# Patient Record
Sex: FEMALE | Race: WHITE
[De-identification: names, ages, dates, MRNs, and addresses within clinical notes are randomized per-mention and may not be internally consistent; named-entity substitution may affect disease eponyms.]

---

## 2017-02-23 ENCOUNTER — HOSPITAL ENCOUNTER (INPATIENT)
Dept: HOSPITAL 10 - REC | Age: 62
LOS: 1 days | Discharge: HOME | DRG: 583 | End: 2017-02-24
Attending: SURGERY | Admitting: SURGERY
Payer: COMMERCIAL

## 2017-02-23 VITALS — SYSTOLIC BLOOD PRESSURE: 141 MMHG | HEART RATE: 82 BPM | DIASTOLIC BLOOD PRESSURE: 67 MMHG | RESPIRATION RATE: 12 BRPM

## 2017-02-23 VITALS — DIASTOLIC BLOOD PRESSURE: 67 MMHG | HEART RATE: 82 BPM | RESPIRATION RATE: 18 BRPM | SYSTOLIC BLOOD PRESSURE: 152 MMHG

## 2017-02-23 VITALS — DIASTOLIC BLOOD PRESSURE: 60 MMHG | RESPIRATION RATE: 13 BRPM | HEART RATE: 80 BPM | SYSTOLIC BLOOD PRESSURE: 141 MMHG

## 2017-02-23 VITALS — DIASTOLIC BLOOD PRESSURE: 64 MMHG | HEART RATE: 80 BPM | RESPIRATION RATE: 16 BRPM | SYSTOLIC BLOOD PRESSURE: 148 MMHG

## 2017-02-23 VITALS — RESPIRATION RATE: 16 BRPM | DIASTOLIC BLOOD PRESSURE: 66 MMHG | SYSTOLIC BLOOD PRESSURE: 144 MMHG | HEART RATE: 82 BPM

## 2017-02-23 VITALS — DIASTOLIC BLOOD PRESSURE: 71 MMHG | RESPIRATION RATE: 16 BRPM | SYSTOLIC BLOOD PRESSURE: 133 MMHG | HEART RATE: 88 BPM

## 2017-02-23 VITALS — HEART RATE: 84 BPM | DIASTOLIC BLOOD PRESSURE: 75 MMHG | SYSTOLIC BLOOD PRESSURE: 139 MMHG | RESPIRATION RATE: 18 BRPM

## 2017-02-23 VITALS — DIASTOLIC BLOOD PRESSURE: 70 MMHG | RESPIRATION RATE: 18 BRPM | SYSTOLIC BLOOD PRESSURE: 139 MMHG | HEART RATE: 86 BPM

## 2017-02-23 VITALS — RESPIRATION RATE: 18 BRPM | DIASTOLIC BLOOD PRESSURE: 63 MMHG | HEART RATE: 81 BPM | SYSTOLIC BLOOD PRESSURE: 130 MMHG

## 2017-02-23 VITALS — SYSTOLIC BLOOD PRESSURE: 138 MMHG | HEART RATE: 84 BPM | DIASTOLIC BLOOD PRESSURE: 67 MMHG | RESPIRATION RATE: 21 BRPM

## 2017-02-23 VITALS — SYSTOLIC BLOOD PRESSURE: 138 MMHG | DIASTOLIC BLOOD PRESSURE: 98 MMHG | HEART RATE: 78 BPM | RESPIRATION RATE: 18 BRPM

## 2017-02-23 VITALS — RESPIRATION RATE: 16 BRPM | DIASTOLIC BLOOD PRESSURE: 74 MMHG | SYSTOLIC BLOOD PRESSURE: 146 MMHG | HEART RATE: 86 BPM

## 2017-02-23 VITALS — DIASTOLIC BLOOD PRESSURE: 69 MMHG | RESPIRATION RATE: 21 BRPM | SYSTOLIC BLOOD PRESSURE: 149 MMHG | HEART RATE: 84 BPM

## 2017-02-23 VITALS — SYSTOLIC BLOOD PRESSURE: 146 MMHG | DIASTOLIC BLOOD PRESSURE: 66 MMHG | RESPIRATION RATE: 14 BRPM | HEART RATE: 82 BPM

## 2017-02-23 VITALS — HEART RATE: 86 BPM | RESPIRATION RATE: 18 BRPM | DIASTOLIC BLOOD PRESSURE: 66 MMHG | SYSTOLIC BLOOD PRESSURE: 146 MMHG

## 2017-02-23 VITALS — DIASTOLIC BLOOD PRESSURE: 69 MMHG | SYSTOLIC BLOOD PRESSURE: 128 MMHG | HEART RATE: 71 BPM | RESPIRATION RATE: 19 BRPM

## 2017-02-23 VITALS
HEIGHT: 61 IN | BODY MASS INDEX: 27.06 KG/M2 | HEIGHT: 61 IN | WEIGHT: 143.3 LBS | WEIGHT: 143.3 LBS | BODY MASS INDEX: 27.06 KG/M2

## 2017-02-23 VITALS — SYSTOLIC BLOOD PRESSURE: 146 MMHG | DIASTOLIC BLOOD PRESSURE: 65 MMHG | RESPIRATION RATE: 21 BRPM | HEART RATE: 88 BPM

## 2017-02-23 VITALS — HEART RATE: 85 BPM | RESPIRATION RATE: 18 BRPM | DIASTOLIC BLOOD PRESSURE: 65 MMHG | SYSTOLIC BLOOD PRESSURE: 149 MMHG

## 2017-02-23 VITALS — HEART RATE: 82 BPM | DIASTOLIC BLOOD PRESSURE: 60 MMHG | RESPIRATION RATE: 16 BRPM | SYSTOLIC BLOOD PRESSURE: 134 MMHG

## 2017-02-23 VITALS — RESPIRATION RATE: 18 BRPM | HEART RATE: 88 BPM | SYSTOLIC BLOOD PRESSURE: 142 MMHG | DIASTOLIC BLOOD PRESSURE: 72 MMHG

## 2017-02-23 VITALS — HEART RATE: 80 BPM | SYSTOLIC BLOOD PRESSURE: 132 MMHG | DIASTOLIC BLOOD PRESSURE: 60 MMHG | RESPIRATION RATE: 14 BRPM

## 2017-02-23 VITALS — HEART RATE: 82 BPM | RESPIRATION RATE: 14 BRPM | SYSTOLIC BLOOD PRESSURE: 131 MMHG | DIASTOLIC BLOOD PRESSURE: 62 MMHG

## 2017-02-23 VITALS — RESPIRATION RATE: 17 BRPM | HEART RATE: 82 BPM | SYSTOLIC BLOOD PRESSURE: 144 MMHG | DIASTOLIC BLOOD PRESSURE: 63 MMHG

## 2017-02-23 VITALS — SYSTOLIC BLOOD PRESSURE: 166 MMHG | DIASTOLIC BLOOD PRESSURE: 73 MMHG | RESPIRATION RATE: 18 BRPM | HEART RATE: 94 BPM

## 2017-02-23 VITALS — SYSTOLIC BLOOD PRESSURE: 142 MMHG | DIASTOLIC BLOOD PRESSURE: 64 MMHG | RESPIRATION RATE: 20 BRPM | HEART RATE: 84 BPM

## 2017-02-23 VITALS — RESPIRATION RATE: 16 BRPM | DIASTOLIC BLOOD PRESSURE: 72 MMHG | SYSTOLIC BLOOD PRESSURE: 150 MMHG

## 2017-02-23 VITALS — RESPIRATION RATE: 14 BRPM | SYSTOLIC BLOOD PRESSURE: 140 MMHG | DIASTOLIC BLOOD PRESSURE: 62 MMHG

## 2017-02-23 VITALS — DIASTOLIC BLOOD PRESSURE: 67 MMHG | HEART RATE: 82 BPM | SYSTOLIC BLOOD PRESSURE: 142 MMHG | RESPIRATION RATE: 18 BRPM

## 2017-02-23 VITALS — DIASTOLIC BLOOD PRESSURE: 68 MMHG | HEART RATE: 86 BPM | RESPIRATION RATE: 12 BRPM | SYSTOLIC BLOOD PRESSURE: 131 MMHG

## 2017-02-23 DIAGNOSIS — G89.18: ICD-10-CM

## 2017-02-23 DIAGNOSIS — C50.912: Primary | ICD-10-CM

## 2017-02-23 DIAGNOSIS — E11.9: ICD-10-CM

## 2017-02-23 DIAGNOSIS — I10: ICD-10-CM

## 2017-02-23 PROCEDURE — 88307 TISSUE EXAM BY PATHOLOGIST: CPT

## 2017-02-23 PROCEDURE — 0HTU0ZZ RESECTION OF LEFT BREAST, OPEN APPROACH: ICD-10-PCS | Performed by: SURGERY

## 2017-02-23 PROCEDURE — 80048 BASIC METABOLIC PNL TOTAL CA: CPT

## 2017-02-23 PROCEDURE — 76705 ECHO EXAM OF ABDOMEN: CPT

## 2017-02-23 PROCEDURE — 82962 GLUCOSE BLOOD TEST: CPT

## 2017-02-23 PROCEDURE — 85025 COMPLETE CBC W/AUTO DIFF WBC: CPT

## 2017-02-23 RX ADMIN — SODIUM CHLORIDE AND POTASSIUM CHLORIDE SCH MLS/HR: 4.5; 1.49 INJECTION, SOLUTION INTRAVENOUS at 18:57

## 2017-02-23 RX ADMIN — INSULIN GLARGINE SCH UNIT: 100 INJECTION, SOLUTION SUBCUTANEOUS at 21:48

## 2017-02-23 NOTE — OPR
DATE OF OPERATION:  02/23/2017

 

 

PREOPERATIVE DIAGNOSIS:  Locally advanced left breast cancer.

 

POSTOPERATIVE DIAGNOSIS:  Locally advanced left breast cancer.

 

OPERATION PERFORMED:  Left modified radical mastectomy.

 

ANESTHESIA:  General.

 

ANESTHESIOLOGIST:  Akshat Liao DO

 

SURGEON:  Moody Lopez MD

 

ASSISTANT:  Herbert Domínguez MD

 

INDICATIONS FOR PROCEDURE:  The patient is a 62-year-old female who presented with a large mass in h
er left breast.  Workup including biopsy revealed a large cancer.  The patient was offered neoadjuva
nt chemotherapy, but did not wish to proceed in that fashion.  Therefore, she was scheduled for left
 modified radical mastectomy and she consented.

 

DESCRIPTION OF PROCEDURE:  The patient was brought to the operating theater, placed under general en
dotracheal tube anesthesia.  The left breast and axillary region was prepped and draped in usual zia
rile fashion.  Planned elliptical incision around the large palpable mass including the nipple areol
ar complex was demarcated with marking pen and carried out with 15 blade scalpel.  Subcutaneous tiss
ue was dissected with cautery.  [place] clamps were used to elevate the skin edges and skin flap
s were created sequentially using cautery, first to the clavicle superiorly then to the sternal bord
er medially to the inframammary fold, inferiorly and laterally until latissimus dorsi muscle was miroslava
ntified throughout its course.  Mastectomy then took place from medial to lateral using cautery.  At
 the border of the pectoralis major muscle, the pectoralis muscle was identified and the clavipector
al fascia was incised with blunt dissection along the chest wall.  The long thoracic nerve was ident
ified and kept out of harm's way.  More superiorly, the axillary vein was identified and dissected f
rom medial to lateral.  Subsequently, the thoracodorsal neurovascular bundle was identified in its u
sual anatomic location and kept out of harm's way.  Node bearing tissue between the long thoracic ne
rve and thoracodorsal nerve was meticulously harvested using the LigaSure device.  Final connective 
tissue attachments at the latissimus dorsi muscle were then transected with cautery.  Specimen was o
riented and sent for permanent pathologic analysis.  The wound was irrigated.  Minimal bleeding was 
controlled with cautery.  Two #10 flat Prashant-Jaffe drains were then brought through the left mid a
xillary line.  One was cut to size and laid within the axilla, the other was laid over the pectorali
s major muscle.  Both drains were secured in place with 2-0 nylon suture in the standard fashion.  T
he final irrigation and inspection took place.  Some redundant skin at the lateral aspect of the inc
ision was excised and the skin was then closed with a deep dermal layer of 4-0 Vicryl sutures in int
errupted fashion, followed by final skin approximation with 4-0 Vicryl sutures in subcuticular fashi
on.  Benzoin and Steri-Strips were then applied.  The patient tolerated the procedure well.  The est
imated blood loss was 100 mL.  There were no complications and the patient was transported in stable
 condition to the recovery room where circumferential compression wrap was applied.

 

 

Dictated By: MOODY LOPEZ MD

 

TL/NTS

DD:    02/23/2017 13:54:53

DT:    02/23/2017 14:14:57

Conf#: [id number]

DID#:  [id number]

CC: HERBERT DOMÍNGUEZ MD;*EndCC*

## 2017-02-23 NOTE — HP
DATE OF ADMISSION: 02/23/2017

 

CHIEF COMPLAINT AND HISTORY OF PRESENT ILLNESS:  The patient is a 62-year-old female with history of
 hypertension, diabetes and locally advanced left breast cancer.  The patient was seen by Dr. Lopez 
as an outpatient and was brought into hospital for left modified radical mastectomy.  The patient po
stoperatively has significant chest wall pain.  The patient is being admitted for further evaluation
 and management.  The patient denied any nausea, vomiting.  No recent history of fever or chills.  N
o history of dysuria or hematuria, no history of headache, dizziness or syncope.  No history of pare
sthesias or weakness in any extremities.  No history of acute skin rash or any joint swelling.

 

REVIEW OF SYSTEMS:  Were unremarkable.

 

PAST SURGICAL HISTORY:  None.

 

ALLERGIES:  NONE.

 

FAMILY HISTORY:  Patient's mother had stomach cancer.

 

MEDICATIONS:  List reviewed.

 

PHYSICAL EXAMINATION:

GENERAL:  The patient is awake, alert.

VITAL SIGNS:  Temperature 97.9, pulse 88, blood pressure 150/72, O2 saturation 100% on room air, res
pirations 16.

HEENT:  Conjunctivae and lids are normal.  Oropharynx clear.

NECK:  Supple.  No mass, no thyromegaly.

LUNGS:  Clear to auscultation.

CARDIOVASCULAR:  S1, S2 normal, no murmur.

ABDOMEN:  Soft, nondistended, nontender.  Bowel sounds positive.

EXTREMITIES:  No leg edema.

NEUROLOGIC:  The patient is awake, alert, fairly oriented with no gross focal deficit.

 

LABORATORY DATA:  Sodium 136, potassium 4.5, BUN 13, creatinine 0.5, glucose 234.

 

IMPRESSION:

1.  Locally advanced left breast cancer status post modified radical mastectomy.

2.  Hypertension.

3.  Diabetes mellitus.

 

PLAN:  The patient admitted on medical floor.  Patient will be started on Lantus as well as sliding 
scale insulin.  The patient will be continued on metformin.  We will also continue ACE inhibitor.  S
CD for DVT prophylaxis.  The patient will be started on Tylenol, Norco and IV morphine for pain cont
rol, depending upon severity.  Plan of care discussed with the patient's nurse.

 

 

Dictated By: BRYAN RAMIREZ/NTS

DD:    02/23/2017 17:54:58

DT:    02/23/2017 18:40:21

Conf#: [id number]

DID#:  [id number]

## 2017-02-24 VITALS — SYSTOLIC BLOOD PRESSURE: 115 MMHG | DIASTOLIC BLOOD PRESSURE: 59 MMHG | RESPIRATION RATE: 18 BRPM

## 2017-02-24 VITALS — RESPIRATION RATE: 18 BRPM | HEART RATE: 77 BPM | SYSTOLIC BLOOD PRESSURE: 116 MMHG | DIASTOLIC BLOOD PRESSURE: 66 MMHG

## 2017-02-24 VITALS — RESPIRATION RATE: 20 BRPM | SYSTOLIC BLOOD PRESSURE: 150 MMHG | HEART RATE: 87 BPM | DIASTOLIC BLOOD PRESSURE: 65 MMHG

## 2017-02-24 LAB
ADD SCAN DIFF: NO
ANION GAP SERPL CALC-SCNC: 13 MMOL/L (ref 8–16)
BASOPHILS # BLD AUTO: 0.1 10^3/UL (ref 0–0.1)
BASOPHILS NFR BLD: 0.5 % (ref 0–2)
BUN SERPL-MCNC: 9 MG/DL (ref 7–20)
CALCIUM SERPL-MCNC: 8.8 MG/DL (ref 8.4–10.2)
CHLORIDE SERPL-SCNC: 104 MMOL/L (ref 97–110)
CO2 SERPL-SCNC: 27 MMOL/L (ref 21–31)
CREAT SERPL-MCNC: 0.49 MG/DL (ref 0.44–1)
EOSINOPHIL # BLD: 0.3 10^3/UL (ref 0–0.5)
EOSINOPHIL NFR BLD: 2.4 % (ref 0–7)
ERYTHROCYTE [DISTWIDTH] IN BLOOD BY AUTOMATED COUNT: 12.2 % (ref 11.5–14.5)
GLUCOSE SERPL-MCNC: 162 MG/DL (ref 70–220)
HCT VFR BLD CALC: 35.4 % (ref 37–47)
HGB BLD-MCNC: 12.1 G/DL (ref 12–16)
LYMPHOCYTES # BLD AUTO: 3.3 10^3/UL (ref 0.8–2.9)
LYMPHOCYTES NFR BLD AUTO: 30 % (ref 15–51)
MCH RBC QN AUTO: 29.3 PG (ref 29–33)
MCHC RBC AUTO-ENTMCNC: 34.2 G/DL (ref 32–37)
MCV RBC AUTO: 85.7 FL (ref 82–101)
MONOCYTES # BLD: 0.6 10^3/UL (ref 0.3–0.9)
MONOCYTES NFR BLD: 5.7 % (ref 0–11)
NEUTROPHILS # BLD: 6.7 10^3/UL (ref 1.6–7.5)
NEUTROPHILS NFR BLD AUTO: 61.1 % (ref 39–77)
NRBC # BLD MANUAL: 0 10^3/UL (ref 0–0)
NRBC BLD QL: 0 /100WBC (ref 0–0)
PLATELET # BLD: 209 10^3/UL (ref 140–415)
PMV BLD AUTO: 11.2 FL (ref 7.4–10.4)
POTASSIUM SERPL-SCNC: 4.2 MMOL/L (ref 3.5–5.1)
RBC # BLD AUTO: 4.13 10^6/UL (ref 4.2–5.4)
SODIUM SERPL-SCNC: 140 MMOL/L (ref 135–144)
WBC # BLD AUTO: 10.9 10^3/UL (ref 4.8–10.8)

## 2017-02-24 RX ADMIN — SODIUM CHLORIDE AND POTASSIUM CHLORIDE SCH MLS/HR: 4.5; 1.49 INJECTION, SOLUTION INTRAVENOUS at 20:40

## 2017-02-24 RX ADMIN — SODIUM CHLORIDE AND POTASSIUM CHLORIDE SCH MLS/HR: 4.5; 1.49 INJECTION, SOLUTION INTRAVENOUS at 08:50

## 2017-02-24 RX ADMIN — INSULIN GLARGINE SCH UNIT: 100 INJECTION, SOLUTION SUBCUTANEOUS at 21:17

## 2017-02-24 NOTE — RADRPT
PROCEDURE:   US Abdomen (right upper quadrant). 

 

CLINICAL INDICATION:   Right upper quadrant abdomen pain.  

 

TECHNIQUE:   Multiple real-time longitudinal and transverse images of the right upper quadrant of th
e abdomen were acquired utilizing a curved array transducer. Images were reviewed on a high-resoluti
on PACS workstation. 

 

COMPARISON:   None 

 

FINDINGS:

The liver is normal in size and echogenicity.

There is no focal hepatic lesion.  Color Doppler and pulsed Doppler sonography demonstrate normal an
tegrade flow in the portal vein.  

The gallbladder is normal with no stones or wall thickening.  There is no pericholecystic fluid deepa
ection.

The bile ducts are normal with the common bile duct measuring 4.6 mm in diameter.  

The visualized portions of the pancreas are unremarkable with obscuration of the tail of the pancrea
s.  

No free fluid is present.  

The right kidney measures 10.1 cm.  There is normal  echogenicity of the right kidney.  There is no 
perinephric fluid collection.  No hydronephrosis, mass, or calculus is seen.   

 

IMPRESSION:

1.  Unremarkable right upper quadrant abdomen ultrasound.  

 

RPTAT: QQ

_____________________________________________ 

.Chele Rivero MD, MD           Date    Time 

Electronically viewed and signed by .Chele Rivero MD, MD on 02/24/2017 18:03 

 

D:  02/24/2017 18:03  T:  02/24/2017 18:03

.R/

## 2017-02-24 NOTE — PN
Date/Time of Note


Date/Time of Note


DATE: 2/24/17 


TIME: 17:51





Assessment/Plan


VTE Prophylaxis


VTE Prophylaxis Intervention:  SCD's





Lines/Catheters


IV Catheter Type (from [facility]):  Peripheral IV


Urinary Cath still in place:  No





Assessment/Plan


Assessment/Plan


1.  Locally advanced left breast cancer status post modified radical mastectomy.


2.  Hypertension.  Continue benazepril


3.  Diabetes mellitus.  Continue metformin, Lantus and NovoLog.


4.  Abdominal pain, pending abdominal ultrasound.


Further recommendations based on clinical course.  Plan of care discussed Dr. Patel.





Subjective


24 Hr Interval Summary


Free Text/Dictation


Patient's complains of abdominal pain, pending abdominal ultrasound.  Patient 

denies any nausea vomiting.





Exam/Review of Systems


Vital Signs


Vitals





 Vital Signs








  Date Time  Temp Pulse Resp B/P Pulse Ox O2 Delivery O2 Flow Rate FiO2


 


2/24/17 07:00 98.7 80 18 115/59 97   


 


2/24/17 05:43      Room Air  


 


2/24/17 00:05       2.0 














 Intake and Output   


 


 2/23/17 2/23/17 2/24/17





 15:00 23:00 07:00


 


Intake Total 1200 ml  1120 ml


 


Output Total 90 ml 170 ml 90 ml


 


Balance 1110 ml -170 ml 1030 ml











Exam


GENERAL:  The patient is awake, alert.


HEENT:  Conjunctivae and lids are normal.  


NECK:  Supple.  No mass, no thyromegaly.


LUNGS:  Clear to auscultation.


CARDIOVASCULAR:  S1, S2 normal, no murmur.


ABDOMEN:  Soft, nondistended, nontender.  Bowel sounds positive.


EXTREMITIES:  No leg edema.


NEUROLOGIC:  The patient is awake, alert.





Results


Result Diagram:  


2/24/17 0425                                                                   

             2/24/17 0445





Results 24 hrs





Laboratory Tests








Test


  2/23/17


21:44 2/24/17


01:58 2/24/17


04:25 2/24/17


04:45


 


Bedside Glucose 303  H 187    


 


Basophils #   0.1   


 


Basophils %   0.5   


 


Eosinophils #   0.3   


 


Eosinophils %   2.4   


 


Hematocrit   35.4  L 


 


Hemoglobin   12.1   


 


Lymphocytes #   3.3  H 


 


Lymphocytes %   30.0   


 


Mean Corpuscular Hemoglobin   29.3   


 


Mean Corpuscular Hemoglobin


Concent 


  


  34.2  


  


 


 


Mean Corpuscular Volume   85.7   


 


Mean Platelet Volume   11.2  H 


 


Monocytes #   0.6   


 


Monocytes %   5.7   


 


Neutrophils #   6.7   


 


Neutrophils %   61.1   


 


Nucleated Red Blood Cells #   0.0   


 


Nucleated Red Blood Cells %   0.0   


 


Platelet Count   209   


 


Red Blood Count   4.13  L 


 


Red Cell Distribution Width   12.2   


 


White Blood Count   10.9  H 


 


Anion Gap    13  


 


Blood Urea Nitrogen    9  


 


Calcium Level    8.8  


 


Carbon Dioxide Level    27  


 


Chloride Level    104  


 


Creatinine    0.49  


 


Glucose Level    162  


 


Potassium Level    4.2  


 


Sodium Level    140  














Test


  2/24/17


08:49 2/24/17


11:46 


  


 


 


Bedside Glucose 171   267  H  











Medications


Medications





 Current Medications


Ondansetron HCl (Zofran Inj) 4 mg Q6H  PRN IV NAUSEA AND/OR VOMITING Last 

administered on 2/23/17at 15:09; Admin Dose 4 MG;  Start 2/23/17 at 14:00


Morphine Sulfate 2 mg 2 mg Q1H  PRN IV PAIN;  Start 2/23/17 at 14:00


Acetaminophen 100 ml @  400 mls/hr Q6H  PRN IVPB PAIN;  Start 2/23/17 at 14:00


Potassium Chloride/Sodium Chloride (1/2 NS + KCl 20 Meq) 1,000 ml @  75 mls/hr 

L86H31H IV  Last administered on 2/24/17at 08:50; Admin Dose 75 MLS/HR;  Start 2 /23/17 at 18:00


Aspirin (Halfprin) 81 mg DAILY PO  Last administered on 2/24/17at 08:56; Admin 

Dose 81 MG;  Start 2/24/17 at 09:00


Benazepril HCl (Lotensin) 20 mg DAILY PO  Last administered on 2/24/17at 08:57; 

Admin Dose 20 MG;  Start 2/24/17 at 09:00


Miscellaneous Information 1 ea NOTE XX ;  Start 2/23/17 at 18:00


Glucose (Glutose) 15 gm Q15M  PRN PO DECREASED GLUCOSE;  Start 2/23/17 at 18:00


Glucose (Glutose) 22.5 gm Q15M  PRN PO DECREASED GLUCOSE;  Start 2/23/17 at 18:

00


Dextrose (D50w Syringe) 25 ml Q15M  PRN IV DECREASED GLUCOSE;  Start 2/23/17 at 

18:00


Dextrose (D50w Syringe) 50 ml Q15M  PRN IV DECREASED GLUCOSE;  Start 2/23/17 at 

18:00


Glucagon (Glucagen) 1 mg Q15M  PRN IM DECREASED GLUCOSE;  Start 2/23/17 at 18:00


Glucose (Glutose) 15 gm Q15M  PRN BUCCAL DECREASED GLUCOSE;  Start 2/23/17 at 18

:00


Insulin Glargine (Lantus) 30 unit DAILY@20 SC  Last administered on 2/23/17at 21

:48; Admin Dose 30 UNIT;  Start 2/23/17 at 20:00


Diagnostic Test (Pha) (Accucheck) 1 ea 02 XX  Last administered on 2/24/17at 02:

10; Admin Dose 1 EA;  Start 2/24/17 at 02:00


Acetaminophen/ Hydrocodone Bitart (Norco (5/325)) 1 tab Q4H  PRN PO PAIN LEVEL 4

-6;  Start 2/23/17 at 18:00











REGINA RAZO Feb 24, 2017 17:55

## 2017-02-24 NOTE — PN
DATE:  02/24/2017

 

 

Today is postop day #1, postop left modified radical mastectomy with axillary dissection  

 

SUBJECTIVE:  The patient does not have that much of complaint from the chest, but she is having some
 pain in right upper quadrant.  No nausea, no vomiting.  Tolerated diet.

 

OBJECTIVE:

VITAL SIGNS:  Temperature 98.7, heart rate 80, respirations 18, blood pressure 115/59, saturation 97
% on room air.  Prashant-Ajffe drain #1 has drained 180 mL,  #2 has drained 120 mL since operation.  
It is colorless, serosanguineous.

HEART:  Regular.

LUNGS:  Clear.  

CHEST:  The dressing is not that tight.

ABDOMEN:  Soft.  Right upper quadrant, lashell is some tenderness on deep pressure (probably positive M
urphy sign).  

EXTREMITIES:  No pitting edema, no calf tenderness.

 

ASSESSMENT:  A 62-year-old with modified radical mastectomy and axillary dissection for locally adva
nced cancer of the left breast, stable today complaining of right upper quadrant abdominal pain.

 

PLAN:  We are going to get a stat ultrasound of the right upper quadrant to make sure the patient do
es not have cholecystitis.  If that resolves, patient can be discharged home today and follow up wit
h Dr. Lopez in his office.  The patient will be instructed by the nurses how to empty the Prashant-Pr
att and how to measure the drainage and record it on a piece of paper every night or whenever it is 
needed to be done.  When she goes back to Dr. Lopez' office, to take the detailed recording of the d
rainage.

 

 

Dictated By: WESLY KILLIAN/LUIS ALBERTO

DD:    02/24/2017 12:56:42

DT:    02/24/2017 14:03:52

Conf#: [id number]

DID#:  [id number]

## 2017-02-26 NOTE — DS
DATE OF ADMISSION: 02/23/2017

DATE OF DISCHARGE: 02/24/2017

 

FINAL DIAGNOSES:

1.  Locally advanced breast cancer status post modified radical mastectomy.  

2.  Hypertension.

3.  Diabetes mellitus.

 

BRIEF HISTORY:  The patient is a 62-year-old female with history of hypertension, diabetes, locally 
advanced left breast cancer.  The patient was seen by Dr. Lopez as an outpatient and brought to the 
hospital for left modified radical mastectomy.  Postoperative, the patient experienced some signific
ant pain and was admitted for further evaluation and management.

 

HOSPITAL COURSE:  The patient was given Tylenol, morphine, and Norco p.r.n. for pain and Zofran for 
nausea.  The patient was given Lantus and insulin according to sliding scale, NovoLog insulin.  The 
patient's blood pressure was well controlled.  The patient complained of some abdominal pain and gal
lbladder ultrasound was ordered by surgeon, which was unremarkable ultrasound of right upper quadran
t abdominal.  The patient's condition improved.  The patient was able to tolerate diet.  Denied any 
nausea or vomiting.  Pain was well controlled and patient was discharged home.

 

CONDITION ON DISCHARGE:  Hemodynamically stable.

 

ACTIVITY:  As patient tolerates.  No lifting more than 25 pounds for 6 weeks.

 

DISCHARGE DIET:  1800 ADA, 2 g sodium, low fat, low cholesterol diet.

 

DISCHARGE MEDICATIONS:  

1.  The patient was given prescriptions for [place] p.r.n. for pain.  

2.  The patient is to continue on her home medication of metformin.  

3.  NPH insulin.

4.  Quinapril.  

5.  Aspirin.

 

FOLLOWUP:  The patient is instructed to follow up with Dr. Lopez in postoperative appointment next w
[place].

 

Interdisciplinary plan of care was established for this patient.  Plan of care was discussed with Dr Sadia Arthur.

 

 

Dictated By: REGINA RAZO NP for BRYAN ARTHUR MD

 

SR/NTS

DD:    02/26/2017 15:19:41

DT:    02/26/2017 21:17:04

Conf#: [id number]

DID#:  [id number]

CC: OLIVIA LOPEZ MD;*EndCC*

## 2017-03-10 ENCOUNTER — HOSPITAL ENCOUNTER (EMERGENCY)
Dept: HOSPITAL 10 - FTE | Age: 62
Discharge: HOME | End: 2017-03-10
Payer: COMMERCIAL

## 2017-03-10 VITALS
HEART RATE: 81 BPM | TEMPERATURE: 99 F | DIASTOLIC BLOOD PRESSURE: 57 MMHG | RESPIRATION RATE: 18 BRPM | SYSTOLIC BLOOD PRESSURE: 121 MMHG

## 2017-03-10 VITALS
BODY MASS INDEX: 27.26 KG/M2 | BODY MASS INDEX: 27.26 KG/M2 | HEIGHT: 61 IN | WEIGHT: 144.4 LBS | HEIGHT: 61 IN | WEIGHT: 144.4 LBS

## 2017-03-10 DIAGNOSIS — T81.4XXA: Primary | ICD-10-CM

## 2017-03-10 DIAGNOSIS — Z79.84: ICD-10-CM

## 2017-03-10 DIAGNOSIS — Z79.82: ICD-10-CM
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
## 2017-03-10 DIAGNOSIS — Z79.4: ICD-10-CM

## 2017-03-10 DIAGNOSIS — Z85.3: ICD-10-CM

## 2017-03-10 PROCEDURE — 96372 THER/PROPH/DIAG INJ SC/IM: CPT

## 2017-03-10 NOTE — ERD
ER Documentation


Chief Complaint


Date/Time


DATE: 3/10/17 


TIME: 21:03


Chief Complaint


sp left mastectomy  2 weeks ago, c/o pain on left operative site x2 days





HPI


8-year-old female presents with redness and some pain to the left mastectomy 

wound performed 2 weeks ago by Dr. Lopez.  She denies fevers, vomiting, 

shortness breath or chest pain.  She notes the redness and increased pain over 

the last 2 days.





ROS


All systems reviewed and are negative except as per history of present illness.





Medications


Home Meds


Active Scripts


Cephalexin* (Keflex*) 500 Mg Capsule, 500 MG PO QID for 7 Days, CAP


   Prov:YUE CLARKE MD         3/10/17


Sulfamethoxazole-Trimethoprim* (Bactrim* DS) 800-160 Mg Tab, 1 TAB PO BID for 7 

Days, TAB


   Prov:YUE CLARKE MD         3/10/17


Hydrocodone/Acetaminophen (Norco 5-325 Tablet) 1 Each Tablet, 1 TAB PO Q6H Y 

for PAIN, #14 TAB


   Prov:YUE CLARKE MD         3/10/17


Acetaminophen* (Tylophen*) 500 Mg Capsule, 1 CAP PO Q6H Y for PAIN AND OR 

ELEVATED TEMP, #20 CAP


   Prov:KIZZY TILLMAN         8/8/15


Ibuprofen* (Motrin*) 400 Mg Tab, 400 MG PO Q6 Y for PAIN AND/OR INFLAMMATION, #

20 TAB


   Prov:KIZZY TILLMAN         8/8/15


Reported Medications


Quinapril Hcl (Accupril) 10 Mg Tablet, 20 MG PO DAILY


   3/17/14


Aspirin (Aspirin) 81 Mg Tablet.dr, 81 MG PO DAILY


   3/17/14


Nph, Human Insulin Isophane* (Novolin N*) 100 U/Ml Vial, 20 SQ BID, VIAL


   3/17/14


Nph, Human Insulin Isophane (Humulin N) 100 Units/Ml Vial, 15 SQ BID, VIAL


   3/17/14


Metformin* (Glucophage*) 1,000 Mg Tablet, 1000 MG PO BID


   3/17/14





Allergies


Allergies:  


Coded Allergies:  


     No Known Allergies (Unverified  Allergy, 3/17/14)





PMhx/Soc


History of Surgery:  Yes (L mastectomy feb 2017)


Anesthesia Reaction:  No


Hx Neurological Disorder:  No


Hx Respiratory Disorders:  No


Hx Cardiac Disorders:  No


Hx Psychiatric Problems:  No


Hx Miscellaneous Medical Probl:  Yes (breast CA)


Hx Alcohol Use:  No


Hx Substance Use:  No


Hx Tobacco Use:  No


Smoking Status:  Never smoker





Physical Exam


Vitals





Vital Signs








  Date Time  Temp Pulse Resp B/P Pulse Ox O2 Delivery O2 Flow Rate FiO2


 


3/10/17 19:01 99.8 101 20 138/65 98   








Physical Exam


Const:  [] Alert, non-ill-appearing.


Head:   Atraumatic 


Eyes:    Normal Conjunctiva


ENT:    Normal External Ears, Nose and Mouth.


Neck:               Full range of motion..~ No meningismus.


Resp:    Clear to auscultation bilaterally


Cardio:    Regular rate and rhythm, no murmurs


Abd:    Soft, non tender, non distended. Normal bowel sounds


Skin:    No petechiae or rashes.  His healing left mastectomy wound with some  

surrounding redness at the wound edges and extending superiorly on the medial 

portion of the wound..  There is no dehiscence or active discharge or bleeding 

or fluctuance.


Back:    No midline or flank tenderness


Ext:    No cyanosis, or edema


Neur:    Awake and alert


Psych:    Normal Mood and Affect


Results 24 hrs





 Current Medications








 Medications


  (Trade)  Dose


 Ordered  Sig/Venu


 Route


 PRN Reason  Start Time


 Stop Time Status Last Admin


Dose Admin


 


 Ceftriaxone Sodium


  (Rocephin)  1 gm  ONCE  ONCE


 IM


   3/10/17 20:30


 3/10/17 20:31 DC 3/10/17 20:46


 


 


 Lidocaine


  (Xylocaine 1%


  (Mdv) 20 ml)  20 ml  ONCE  ONCE


 SC


   3/10/17 20:30


 3/10/17 20:31 DC 3/10/17 20:46


 


 


 Ibuprofen


  (Motrin)  600 mg  ONCE  ONCE


 PO


   3/10/17 20:30


 3/10/17 20:31 DC 3/10/17 20:46


 


 


 Trimethoprim/


 Sulfamethoxazole


  (Bactrim (Ds))  1 tab  ONCE  ONCE


 PO


   3/10/17 20:30


 3/10/17 20:31 DC 3/10/17 20:47


 


 


 Acetaminophen/


 Hydrocodone Bitart


  (Norco (5/325))  1 tab  ONCE  ONCE


 PO


   3/10/17 20:30


 3/10/17 20:31 DC 3/10/17 20:47


 











Procedures/MDM


Patient presents with signs and symptoms of worsening redness and pain over 

last 2 days of her left mastectomy performed 2 weeks ago.  She appears to have 

a postoperative wound infection which appears superficial.  She was given 

Rocephin 1 g IM and Bactrim double strength by mouth as well as Norco 5 mg by 

mouth for pain.  She will discharged home with prescription of Bactrim and 

Keflex and Norco and instructions to follow-up with Dr. Lopez as scheduled in 

the next 2-3 days.  Patient should return sooner for fevers, vomiting, 

shortness breath, chest pain, new or worsening symptoms.  There is no evidence 

of abscess or signs or symptoms to suggest sepsis, acute abdomen, pneumonia, 

additional causes of pain and redness of the chest wall.





Departure


Diagnosis:  


 Primary Impression:  


 Post op infection


Condition:  Stable


Patient Instructions:  Post Op Wound Check, Infection





Additional Instructions:  


CHEQUE CON FAIRCHILD MAYELIN MG MAS PRONTO PARA FIEBRE , NUEVA SIMPTOMAS.











YUE CLARKE MD Mar 10, 2017 21:05

## 2017-03-15 RX ADMIN — FOLIC ACID SCH MLS/HR: 5 INJECTION, SOLUTION INTRAMUSCULAR; INTRAVENOUS; SUBCUTANEOUS at 16:30

## 2017-03-16 ENCOUNTER — HOSPITAL ENCOUNTER (INPATIENT)
Dept: HOSPITAL 10 - SDS | Age: 62
LOS: 6 days | Discharge: HOME HEALTH | DRG: 863 | End: 2017-03-22
Attending: SURGERY | Admitting: SURGERY
Payer: COMMERCIAL

## 2017-03-16 VITALS — DIASTOLIC BLOOD PRESSURE: 67 MMHG | RESPIRATION RATE: 15 BRPM | SYSTOLIC BLOOD PRESSURE: 158 MMHG | HEART RATE: 76 BPM

## 2017-03-16 VITALS — SYSTOLIC BLOOD PRESSURE: 126 MMHG | DIASTOLIC BLOOD PRESSURE: 63 MMHG | HEART RATE: 72 BPM | RESPIRATION RATE: 22 BRPM

## 2017-03-16 VITALS — SYSTOLIC BLOOD PRESSURE: 152 MMHG | RESPIRATION RATE: 18 BRPM | DIASTOLIC BLOOD PRESSURE: 73 MMHG | HEART RATE: 71 BPM

## 2017-03-16 VITALS — HEART RATE: 76 BPM | RESPIRATION RATE: 14 BRPM | SYSTOLIC BLOOD PRESSURE: 160 MMHG | DIASTOLIC BLOOD PRESSURE: 62 MMHG

## 2017-03-16 VITALS — HEART RATE: 74 BPM | DIASTOLIC BLOOD PRESSURE: 70 MMHG | RESPIRATION RATE: 14 BRPM | SYSTOLIC BLOOD PRESSURE: 152 MMHG

## 2017-03-16 VITALS — DIASTOLIC BLOOD PRESSURE: 70 MMHG | SYSTOLIC BLOOD PRESSURE: 153 MMHG | HEART RATE: 74 BPM | RESPIRATION RATE: 15 BRPM

## 2017-03-16 VITALS — DIASTOLIC BLOOD PRESSURE: 61 MMHG | RESPIRATION RATE: 16 BRPM | HEART RATE: 74 BPM | SYSTOLIC BLOOD PRESSURE: 157 MMHG

## 2017-03-16 VITALS — HEART RATE: 70 BPM | DIASTOLIC BLOOD PRESSURE: 73 MMHG | RESPIRATION RATE: 15 BRPM | SYSTOLIC BLOOD PRESSURE: 153 MMHG

## 2017-03-16 VITALS
WEIGHT: 138.89 LBS | HEIGHT: 60 IN | WEIGHT: 138.89 LBS | BODY MASS INDEX: 27.27 KG/M2 | BODY MASS INDEX: 27.27 KG/M2 | HEIGHT: 60 IN

## 2017-03-16 VITALS — RESPIRATION RATE: 20 BRPM | DIASTOLIC BLOOD PRESSURE: 57 MMHG | HEART RATE: 72 BPM | SYSTOLIC BLOOD PRESSURE: 116 MMHG

## 2017-03-16 VITALS — SYSTOLIC BLOOD PRESSURE: 112 MMHG | HEART RATE: 77 BPM | RESPIRATION RATE: 18 BRPM | DIASTOLIC BLOOD PRESSURE: 56 MMHG

## 2017-03-16 VITALS — SYSTOLIC BLOOD PRESSURE: 138 MMHG | DIASTOLIC BLOOD PRESSURE: 62 MMHG | HEART RATE: 72 BPM | RESPIRATION RATE: 22 BRPM

## 2017-03-16 VITALS — DIASTOLIC BLOOD PRESSURE: 70 MMHG | RESPIRATION RATE: 14 BRPM | SYSTOLIC BLOOD PRESSURE: 152 MMHG | HEART RATE: 72 BPM

## 2017-03-16 VITALS — HEART RATE: 70 BPM | RESPIRATION RATE: 14 BRPM | SYSTOLIC BLOOD PRESSURE: 142 MMHG | DIASTOLIC BLOOD PRESSURE: 65 MMHG

## 2017-03-16 VITALS — HEART RATE: 80 BPM | SYSTOLIC BLOOD PRESSURE: 162 MMHG | DIASTOLIC BLOOD PRESSURE: 73 MMHG | RESPIRATION RATE: 18 BRPM

## 2017-03-16 VITALS — RESPIRATION RATE: 20 BRPM | DIASTOLIC BLOOD PRESSURE: 66 MMHG | SYSTOLIC BLOOD PRESSURE: 124 MMHG

## 2017-03-16 DIAGNOSIS — B95.61: ICD-10-CM

## 2017-03-16 DIAGNOSIS — Z79.4: ICD-10-CM

## 2017-03-16 DIAGNOSIS — E11.9: ICD-10-CM

## 2017-03-16 DIAGNOSIS — I10: ICD-10-CM

## 2017-03-16 DIAGNOSIS — L03.313: ICD-10-CM

## 2017-03-16 DIAGNOSIS — T81.4XXA: Primary | ICD-10-CM

## 2017-03-16 DIAGNOSIS — Z85.3: ICD-10-CM

## 2017-03-16 LAB
ADD SCAN DIFF: NO
ANION GAP SERPL CALC-SCNC: 18 MMOL/L (ref 8–16)
APTT BLD: 28.9 SEC (ref 25–35)
BASOPHILS # BLD AUTO: 0.1 10^3/UL (ref 0–0.1)
BASOPHILS NFR BLD: 0.6 % (ref 0–2)
BUN SERPL-MCNC: 15 MG/DL (ref 7–20)
CALCIUM SERPL-MCNC: 10 MG/DL (ref 8.4–10.2)
CHLORIDE SERPL-SCNC: 97 MMOL/L (ref 97–110)
CO2 SERPL-SCNC: 27 MMOL/L (ref 21–31)
CREAT SERPL-MCNC: 0.61 MG/DL (ref 0.44–1)
EOSINOPHIL # BLD: 0.1 10^3/UL (ref 0–0.5)
EOSINOPHIL NFR BLD: 0.9 % (ref 0–7)
ERYTHROCYTE [DISTWIDTH] IN BLOOD BY AUTOMATED COUNT: 12 % (ref 11.5–14.5)
GLUCOSE SERPL-MCNC: 173 MG/DL (ref 70–220)
HCT VFR BLD CALC: 37.4 % (ref 37–47)
HGB BLD-MCNC: 12.9 G/DL (ref 12–16)
INR PPP: 1.07
LYMPHOCYTES # BLD AUTO: 2 10^3/UL (ref 0.8–2.9)
LYMPHOCYTES NFR BLD AUTO: 21.8 % (ref 15–51)
MCH RBC QN AUTO: 29.3 PG (ref 29–33)
MCHC RBC AUTO-ENTMCNC: 34.5 G/DL (ref 32–37)
MCV RBC AUTO: 85 FL (ref 82–101)
MONOCYTES # BLD: 0.6 10^3/UL (ref 0.3–0.9)
MONOCYTES NFR BLD: 6.4 % (ref 0–11)
NEUTROPHILS # BLD: 6.3 10^3/UL (ref 1.6–7.5)
NEUTROPHILS NFR BLD AUTO: 69.9 % (ref 39–77)
NRBC # BLD MANUAL: 0 10^3/UL (ref 0–0)
NRBC BLD QL: 0 /100WBC (ref 0–0)
PLATELET # BLD: 431 10^3/UL (ref 140–415)
PMV BLD AUTO: 9.1 FL (ref 7.4–10.4)
POTASSIUM SERPL-SCNC: 5.1 MMOL/L (ref 3.5–5.1)
PROTHROMBIN TIME: 13.9 SEC (ref 12.2–14.2)
PT RATIO: 1.1
RBC # BLD AUTO: 4.4 10^6/UL (ref 4.2–5.4)
SODIUM SERPL-SCNC: 137 MMOL/L (ref 135–144)
WBC # BLD AUTO: 8.9 10^3/UL (ref 4.8–10.8)

## 2017-03-16 PROCEDURE — 93005 ELECTROCARDIOGRAM TRACING: CPT

## 2017-03-16 PROCEDURE — 71010: CPT

## 2017-03-16 PROCEDURE — 82962 GLUCOSE BLOOD TEST: CPT

## 2017-03-16 PROCEDURE — 87075 CULTR BACTERIA EXCEPT BLOOD: CPT

## 2017-03-16 PROCEDURE — 85025 COMPLETE CBC W/AUTO DIFF WBC: CPT

## 2017-03-16 PROCEDURE — 85730 THROMBOPLASTIN TIME PARTIAL: CPT

## 2017-03-16 PROCEDURE — 90686 IIV4 VACC NO PRSV 0.5 ML IM: CPT

## 2017-03-16 PROCEDURE — 80048 BASIC METABOLIC PNL TOTAL CA: CPT

## 2017-03-16 PROCEDURE — 85610 PROTHROMBIN TIME: CPT

## 2017-03-16 PROCEDURE — 87070 CULTURE OTHR SPECIMN AEROBIC: CPT

## 2017-03-16 RX ADMIN — FOLIC ACID SCH MLS/HR: 5 INJECTION, SOLUTION INTRAMUSCULAR; INTRAVENOUS; SUBCUTANEOUS at 05:50

## 2017-03-16 RX ADMIN — CALCIUM GLUCONATE SCH MLS/HR: 94 INJECTION, SOLUTION INTRAVENOUS at 22:19

## 2017-03-16 NOTE — RADRPT
PROCEDURE:   XR Chest. 

 

CLINICAL INDICATION:   Preoperative study 

 

TECHNIQUE:   Single AP view of the chest were obtained 

 

COMPARISON:   None 

 

FINDINGS:

The heart and mediastinum are within normal limits. The pulmonary vasculature are unremarkable.  The
 aorta is grossly unremarkable.  There is no lung consolidation, pleural effusion or pneumothorax.  
Degenerative changes are seen within the thoracic spine.  There is no acute osseous abnormality.

 

IMPRESSION:

No acute disease. 

 

RPTAT: AA

_____________________________________________ 

.Tamie Hernandez MD, MD           Date    Time 

Electronically viewed and signed by .Tamie Hernandez MD, MD on 03/16/2017 15:30 

 

D:  03/16/2017 15:30  T:  03/16/2017 15:30

.LUCHO/

## 2017-03-16 NOTE — OPR
DATE OF OPERATION:  03/16/2017

 

 

PREOPERATIVE DIAGNOSIS:  Left chest wall/wound abscess.

 

POSTOPERATIVE DIAGNOSIS:  Left chest wall/wound abscess.

 

OPERATION PERFORMED:  I and D of left chest wall/wound abscess.

 

ANESTHESIA:  General.

 

ANESTHESIOLOGIST:  Deedee Loja MD

 

SURGEON:  Moody Lopez MD

 

ASSISTANT:  None.

 

INDICATIONS FOR PROCEDURE:  The patient is an unfortunate 62-year-old female who was recently treate
d for relatively advanced left breast cancer.  She had comorbidity of diabetes, and she presented ap
proximately 10 days postop with evidence of a wound infection associated with a chest wall abscess. 
 She was counseled as to need for surgical drainage.  She consented and was scheduled for surgery.

 

DESCRIPTION OF PROCEDURE:  The patient was brought to the operating theater, placed under general an
esthesia.  The left chest wall was prepped and draped in usual sterile fashion.  The medial aspect o
f previous surgical incision was incised.  Copious amounts of pus were obtained.  The cavity was suc
tion evacuated.  Counterincision was made laterally, and irrigation of the entire wound cavity took 
place with a combination of hydrogen peroxide and Betadine.  Please note that prior to irrigation, w
ound cultures were taken.  At this point, a Penrose drain was brought through the wound and sutured 
to itself.  The patient tolerated procedure well.  The estimated blood loss was 10 mL.  There were n
o complications.  The patient was transported in stable condition to recovery room, where circumfere
ntial compression dressing was applied.

 

 

Dictated By: MOODY LOPEZ MD

 

TL/NTS

DD:    03/16/2017 18:59:01

DT:    03/16/2017 19:26:28

Conf#: [id number]

DID#:  [id number]

## 2017-03-17 VITALS — SYSTOLIC BLOOD PRESSURE: 138 MMHG | RESPIRATION RATE: 18 BRPM | DIASTOLIC BLOOD PRESSURE: 79 MMHG

## 2017-03-17 VITALS — RESPIRATION RATE: 18 BRPM | DIASTOLIC BLOOD PRESSURE: 63 MMHG | SYSTOLIC BLOOD PRESSURE: 134 MMHG

## 2017-03-17 VITALS — HEART RATE: 74 BPM | DIASTOLIC BLOOD PRESSURE: 69 MMHG | RESPIRATION RATE: 18 BRPM | SYSTOLIC BLOOD PRESSURE: 148 MMHG

## 2017-03-17 VITALS — DIASTOLIC BLOOD PRESSURE: 61 MMHG | SYSTOLIC BLOOD PRESSURE: 132 MMHG | RESPIRATION RATE: 18 BRPM

## 2017-03-17 VITALS — SYSTOLIC BLOOD PRESSURE: 167 MMHG | DIASTOLIC BLOOD PRESSURE: 85 MMHG | RESPIRATION RATE: 19 BRPM

## 2017-03-17 PROCEDURE — 0W9800Z DRAINAGE OF CHEST WALL WITH DRAINAGE DEVICE, OPEN APPROACH: ICD-10-PCS | Performed by: SURGERY

## 2017-03-17 RX ADMIN — DOCUSATE SODIUM SCH MG: 100 CAPSULE, LIQUID FILLED ORAL at 10:56

## 2017-03-17 RX ADMIN — Medication PRN MLS/HR: at 10:16

## 2017-03-17 RX ADMIN — CALCIUM GLUCONATE SCH MLS/HR: 94 INJECTION, SOLUTION INTRAVENOUS at 08:30

## 2017-03-17 RX ADMIN — CALCIUM GLUCONATE SCH MLS/HR: 94 INJECTION, SOLUTION INTRAVENOUS at 17:21

## 2017-03-17 RX ADMIN — PIPERACILLIN SODIUM AND TAZOBACTAM SODIUM SCH MLS/HR: 3; .375 INJECTION, POWDER, LYOPHILIZED, FOR SOLUTION INTRAVENOUS at 10:13

## 2017-03-17 RX ADMIN — PIPERACILLIN SODIUM AND TAZOBACTAM SODIUM SCH MLS/HR: 3; .375 INJECTION, POWDER, LYOPHILIZED, FOR SOLUTION INTRAVENOUS at 17:49

## 2017-03-17 RX ADMIN — Medication PRN MLS/HR: at 00:32

## 2017-03-17 RX ADMIN — PIPERACILLIN SODIUM AND TAZOBACTAM SODIUM SCH MLS/HR: 3; .375 INJECTION, POWDER, LYOPHILIZED, FOR SOLUTION INTRAVENOUS at 01:18

## 2017-03-17 RX ADMIN — LISINOPRIL SCH MG: 20 TABLET ORAL at 21:33

## 2017-03-17 RX ADMIN — DOCUSATE SODIUM SCH MG: 100 CAPSULE, LIQUID FILLED ORAL at 20:37

## 2017-03-17 NOTE — PN
DATE:  03/17/2017

 

 

Status post incision and drainage of the chest wall abscess following modified radical mastectomy ab
out 2 weeks ago.

 

SUBJECTIVE:  Feels better.

 

OBJECTIVE: 

VITAL SIGNS:   Temperature 97.9, 62, 18, 134/73, 97% room air saturation.  

The dressing was already removed because it was saturated  There is no active bleeding.  A new steri
le dressing was applied and taped to the chest wall.  Also, another culture was sent from the distal
 end of the wound.  The Penrose drain is in place.

 

ASSESSMENT:

Postop day #1,  status post incision and drainage  of chest wall wound and abscess post mastectomy.

 

PLAN:

1.  Continue antibiotics.  

2.  Keep the patient in house with IV antibiotics.  

3.  Change dressing p.r.n. and daily.

 

 

Dictated By: WESLY KILLIAN/NTS

DD:    03/17/2017 12:08:36

DT:    03/17/2017 12:27:18

Conf#: [id number]

DID#:  [id number]

## 2017-03-17 NOTE — HP
DATE OF ADMISSION: 03/16/2017

 

HISTORY OF PRESENT ILLNESS:  The patient is a 62-year-old  female, known to me from a previo
us admission.  The patient underwent a left modified radical mastectomy on 02/23/2017.  The patient 
also has a history of diabetes, hypertension and locally advanced left breast cancer.  The patient w
as seen in a postoperative appointment with Dr. Lopez and was noted to have a chest wall abscess and
 a postoperative wound infection.  The patient was brought to the hospital and underwent I and D of 
the left chest wall and wound abscess.  The patient required antibiotics and had significant pain an
d was admitted for further evaluation and management.

 

PAST MEDICAL HISTORY:  Positive for hypertension and diabetes.

 

PAST SURGICAL HISTORY:  Per HPI.

 

FAMILY HISTORY:  The patient's mother had gastric cancer.

 

SOCIAL HISTORY:  The patient lives at home.  The patient denies any tobacco use, denies any illicit 
drug use, denies any alcohol use.

 

ALLERGIES:  NO KNOWN ALLERGIES.

 

HOME MEDICATIONS: Include:

1.  Tylenol.

2.  Aspirin.

3.  Norco.

4.  Metformin.

5.  NPH 15 units subcutaneous b.i.d.

6.  Accupril.

 

REVIEW OF SYSTEMS:  A 12-point review of systems was negative unless mentioned in the HPI.

 

PHYSICAL ASSESSMENT:

GENERAL:  Well-developed, well-nourished female, in no acute distress.

VITAL SIGNS:  Temperature 97.9, pulse 62, blood pressure 134/63, respiratory rate 18, oxygen saturat
ion 97% on room air.

HEENT:  Head is atraumatic, normocephalic.  Pupils are equal, round, react to light and accommodatio
n.  Oral mucosa is pink and moist.

NECK:  Supple.  No cervical lymphadenopathy, no thyromegaly.

CHEST:  Lungs clear bilaterally.  There are no rhonchi, wheezes or rales noted.  The patient has a l
eft chest dry, clean and intact dressing.

CARDIOVASCULAR:  Normal S1, S2.  No murmurs, gallops, clicks, or rubs noted.

ABDOMEN:  Round, soft, nondistended, nontender.  Bowel sounds are present in all 4 quadrants.  There
 is no guarding or rebound tenderness.

EXTREMITIES:  There is no edema, clubbing or cyanosis.  Pulses are equal bilaterally at 2+.

SKIN:  There is no rash or petechiae noted.

NEUROLOGIC:  The patient is awake, alert and oriented x4.  No focal deficits noted.  Motor strength 
is 5/5 in all extremities.

 

LABORATORY DATA:  On admission CBC, white blood cells 8.9, hemoglobin 12.9, hematocrit 37.4, platele
ts 431.  Chemistry:  Sodium is 137, potassium 5.1, chloride 97, carbon dioxide 27, anion gap 18, BUN
 is 15, creatinine 0.62, glucose 173.

 

IMAGING:  Chest x-ray, with no acute disease.

 

ASSESSMENT AND PLAN:

1.  Chest wall abscess, status post incision and drainage of the left chest wall and wound abscess. 
 Will continue to follow up surgical recommendations. We will ask Dr. Dreyer to see the patient in i
nfectious disease consultation.  Continue antibiotics and dressing changes.

2.  Status post left modified radical mastectomy on 02/23/2017.

3.  Hypertension.  Will continue the patient's antihypertensive medications from home.  Continue hyd
ralazine p.r.n. for a systolic blood pressure above 170.

4.  Diabetes mellitus type 2.  Will continue the patient on NovoLog per moderate algorithm sliding s
[place].  

5.  Will continue morphine and Tylenol p.r.n. for pain and Zofran p.r.n. for nausea

 

Further recommendations based on clinical course.  Plan of care discussed with Dr. Arthur.

 

 

Dictated By: REGINA RAZO NP for BRYAN ARTHUR MD

 

SR/NTS

DD:    03/17/2017 13:01:14

DT:    03/17/2017 14:07:21

Conf#: [id number]

DID#:  [id number]

## 2017-03-18 VITALS — DIASTOLIC BLOOD PRESSURE: 70 MMHG | SYSTOLIC BLOOD PRESSURE: 155 MMHG | RESPIRATION RATE: 18 BRPM

## 2017-03-18 VITALS — DIASTOLIC BLOOD PRESSURE: 70 MMHG | RESPIRATION RATE: 18 BRPM | SYSTOLIC BLOOD PRESSURE: 147 MMHG | HEART RATE: 76 BPM

## 2017-03-18 VITALS — DIASTOLIC BLOOD PRESSURE: 70 MMHG | RESPIRATION RATE: 18 BRPM | SYSTOLIC BLOOD PRESSURE: 160 MMHG

## 2017-03-18 VITALS — RESPIRATION RATE: 18 BRPM | SYSTOLIC BLOOD PRESSURE: 133 MMHG | DIASTOLIC BLOOD PRESSURE: 77 MMHG

## 2017-03-18 LAB
ADD SCAN DIFF: NO
BASOPHILS # BLD AUTO: 0.1 10^3/UL (ref 0–0.1)
BASOPHILS NFR BLD: 0.7 % (ref 0–2)
EOSINOPHIL # BLD: 0.2 10^3/UL (ref 0–0.5)
EOSINOPHIL NFR BLD: 3.1 % (ref 0–7)
ERYTHROCYTE [DISTWIDTH] IN BLOOD BY AUTOMATED COUNT: 12.2 % (ref 11.5–14.5)
HCT VFR BLD CALC: 36.4 % (ref 37–47)
HGB BLD-MCNC: 12.2 G/DL (ref 12–16)
LYMPHOCYTES # BLD AUTO: 2.5 10^3/UL (ref 0.8–2.9)
LYMPHOCYTES NFR BLD AUTO: 35.1 % (ref 15–51)
MCH RBC QN AUTO: 29.1 PG (ref 29–33)
MCHC RBC AUTO-ENTMCNC: 33.5 G/DL (ref 32–37)
MCV RBC AUTO: 86.9 FL (ref 82–101)
MONOCYTES # BLD: 0.6 10^3/UL (ref 0.3–0.9)
MONOCYTES NFR BLD: 8.1 % (ref 0–11)
NEUTROPHILS # BLD: 3.7 10^3/UL (ref 1.6–7.5)
NEUTROPHILS NFR BLD AUTO: 52.6 % (ref 39–77)
NRBC # BLD MANUAL: 0 10^3/UL (ref 0–0)
NRBC BLD QL: 0 /100WBC (ref 0–0)
PLATELET # BLD: 391 10^3/UL (ref 140–415)
PMV BLD AUTO: 9.7 FL (ref 7.4–10.4)
RBC # BLD AUTO: 4.19 10^6/UL (ref 4.2–5.4)
WBC # BLD AUTO: 7 10^3/UL (ref 4.8–10.8)

## 2017-03-18 RX ADMIN — PIPERACILLIN SODIUM AND TAZOBACTAM SODIUM SCH MLS/HR: 3; .375 INJECTION, POWDER, LYOPHILIZED, FOR SOLUTION INTRAVENOUS at 12:07

## 2017-03-18 RX ADMIN — DOCUSATE SODIUM SCH MG: 100 CAPSULE, LIQUID FILLED ORAL at 21:00

## 2017-03-18 RX ADMIN — CALCIUM GLUCONATE SCH MLS/HR: 94 INJECTION, SOLUTION INTRAVENOUS at 04:30

## 2017-03-18 RX ADMIN — DOCUSATE SODIUM SCH MG: 100 CAPSULE, LIQUID FILLED ORAL at 08:25

## 2017-03-18 RX ADMIN — Medication PRN MLS/HR: at 21:20

## 2017-03-18 RX ADMIN — CALCIUM GLUCONATE SCH MLS/HR: 94 INJECTION, SOLUTION INTRAVENOUS at 14:09

## 2017-03-18 RX ADMIN — Medication PRN MLS/HR: at 06:50

## 2017-03-18 RX ADMIN — LISINOPRIL SCH MG: 20 TABLET ORAL at 08:26

## 2017-03-18 RX ADMIN — PIPERACILLIN SODIUM AND TAZOBACTAM SODIUM SCH MLS/HR: 3; .375 INJECTION, POWDER, LYOPHILIZED, FOR SOLUTION INTRAVENOUS at 01:32

## 2017-03-18 RX ADMIN — PIPERACILLIN SODIUM AND TAZOBACTAM SODIUM SCH MLS/HR: 3; .375 INJECTION, POWDER, LYOPHILIZED, FOR SOLUTION INTRAVENOUS at 18:46

## 2017-03-18 NOTE — CONS
Date/Time of Note


Date/Time of Note


DATE: 3/18/17 


TIME: 15:54





Assessment/Plan


Assessment/Plan


Chief Complaint/Hosp Course


ASSESSMENT 


Left chest wall abscess s/p I&D


Status post left modified radical mastectomy with axillary dissection for 

advanced cancer 2/23/17


DM


HTN


Abx: Zosyn





Plan: Will start Vanco, continue Zosyn, f/u final cx, f/u surgical rec-s





DW DR Mc


Problems:  





Consultation Date/Type/Reason


Admit Date/Time





Initial Consult Date


ID


Referring Provider:  REGINA RAZO





Exam/Review of Systems


Vital Signs


Vitals





 Vital Signs








  Date Time  Temp Pulse Resp B/P Pulse Ox O2 Delivery O2 Flow Rate FiO2


 


3/18/17 08:16 98.0 64 18 133/77 98   


 


3/18/17 06:32      Room Air  


 


3/16/17 19:25       6.0 














 Intake and Output   


 


 3/17/17 3/17/17 3/18/17





 15:00 23:00 07:00


 


Intake Total 800 ml 1580 ml 1850 ml


 


Output Total   1200 ml


 


Balance 800 ml 1580 ml 650 ml











Results


Result Diagram:  


3/18/17 0415                                                                   

             3/16/17 1500





Results 24 hrs





Laboratory Tests








Test


  3/17/17


17:24 3/17/17


20:13 3/18/17


01:31 3/18/17


04:15


 


Bedside Glucose 179   257  H 204   


 


Basophils #    0.1  


 


Basophils %    0.7  


 


Eosinophils #    0.2  


 


Eosinophils %    3.1  


 


Hematocrit    36.4  L


 


Hemoglobin    12.2  


 


Lymphocytes #    2.5  


 


Lymphocytes %    35.1  


 


Mean Corpuscular Hemoglobin    29.1  


 


Mean Corpuscular Hemoglobin


Concent 


  


  


  33.5  


 


 


Mean Corpuscular Volume    86.9  


 


Mean Platelet Volume    9.7  


 


Monocytes #    0.6  


 


Monocytes %    8.1  


 


Neutrophils #    3.7  


 


Neutrophils %    52.6  


 


Nucleated Red Blood Cells #    0.0  


 


Nucleated Red Blood Cells %    0.0  


 


Platelet Count    391  


 


Red Blood Count    4.19  L


 


Red Cell Distribution Width    12.2  


 


White Blood Count    7.0  #














Test


  3/18/17


07:40 3/18/17


11:55 


  


 


 


Bedside Glucose 245  H 337  H  











Medications


Medications





 Current Medications


Ondansetron HCl (Zofran Inj) 4 mg Q6H  PRN IV NAUSEA AND/OR VOMITING;  Start 3/

16/17 at 19:30


Morphine Sulfate 2 mg 2 mg Q1H  PRN IV PAIN;  Start 3/16/17 at 19:30


Acetaminophen 100 ml @  400 mls/hr Q6H  PRN IVPB PAIN Last administered on 3/18/

17at 06:50; Admin Dose 400 MLS/HR;  Start 3/16/17 at 19:30


Piperacillin Sod/ Tazobactam Sod 100 ml @  25 mls/hr TID@02,10,18 IVPB  Last 

administered on 3/18/17at 12:07; Admin Dose 25 MLS/HR;  Start 3/17/17 at 02:00


Sodium Chloride (1/2 NS) 1,000 ml @  100 mls/hr Q10H IV  Last administered on 3/

18/17at 14:09; Admin Dose 100 MLS/HR;  Start 3/16/17 at 22:30


Miscellaneous Information 1 ea NOTE XX ;  Start 3/16/17 at 22:30


Glucose (Glutose) 15 gm Q15M  PRN PO DECREASED GLUCOSE;  Start 3/16/17 at 22:30


Glucose (Glutose) 22.5 gm Q15M  PRN PO DECREASED GLUCOSE;  Start 3/16/17 at 22:

30


Dextrose (D50w Syringe) 25 ml Q15M  PRN IV DECREASED GLUCOSE;  Start 3/16/17 at 

22:30


Dextrose (D50w Syringe) 50 ml Q15M  PRN IV DECREASED GLUCOSE;  Start 3/16/17 at 

22:30


Glucagon (Glucagen) 1 mg Q15M  PRN IM DECREASED GLUCOSE;  Start 3/16/17 at 22:30


Glucose (Glutose) 15 gm Q15M  PRN BUCCAL DECREASED GLUCOSE;  Start 3/16/17 at 22

:30


Docusate Sodium (Colace) 100 mg BID PO  Last administered on 3/18/17at 08:25; 

Admin Dose 100 MG;  Start 3/17/17 at 11:00


Lisinopril (Zestril) 20 mg DAILY PO  Last administered on 3/18/17at 08:26; 

Admin Dose 20 MG;  Start 3/17/17 at 21:30


Clonidine (Catapres) 0.1 mg Q6H  PRN PO ELEVATED SYSTOLIC BP;  Start 3/17/17 at 

21:30


Senna (Senokot) 2 tab BID  PRN PO CONSTIPATION Last administered on 3/17/17at 21

:32; Admin Dose 2 TAB;  Start 3/17/17 at 21:30











TYLER CARBAJAL NP Mar 18, 2017 15:57

## 2017-03-18 NOTE — PN
DATE:  03/18/2017

 

 

SUBJECTIVE:  Feels better.

 

OBJECTIVE:

VITAL SIGNS:  Temperature 98, heart rate 64, respirations 18, blood pressure 133
/77, saturation 98% on room air.

 

LABORATORY DATA:  Blood sugar has been high up to 337.  CBC:  WBC 7000 with 52% 
neutrophils.  Microbiology shows staphylococcus species, no culture available 
yet.

 

ASSESSMENT:  The patient is a 62-year-old female status post modified radical 
mastectomy with axillary dissection for advanced cancer.  The patient had 
modified radical mastectomy 3 weeks ago.  The patient presented with cellulitis 
and abscess formation at the site of operation.  She was taken to the operating 
room and drained by Dr. Lopez, and culture was sent, antibiotic started.  The 
patient is doing fine so far.  IV antibiotic Zosyn.

 

PLAN:  Continue IV antibiotics.  Infectious disease consultation has been 
requested.  Change dressing daily and p.r.n.

 

 

Dictated By: WESLY DOMÍNGUEZ MD

 

PS/NTS

DD:    03/18/2017 14:24:20

DT:    03/18/2017 15:04:09

Conf#: [id number]

DID#:  [id number]

 

BENJAMIN

## 2017-03-18 NOTE — PN
Date/Time of Note


Date/Time of Note


DATE: 3/18/17 


TIME: 12:27





Assessment/Plan


VTE Prophylaxis


VTE Prophylaxis Intervention:  other





Lines/Catheters


IV Catheter Type (from [facility]):  Peripheral IV


Urinary Cath still in place:  No





Assessment/Plan


Chief Complaint/Hosp Course


1.  Chest wall abscess, status post incision and drainage of the left chest 

wall and wound abscess.  Will continue to follow up surgical recommendations. 

We will ask Dr. Dreyer to see the patient in infectious disease consultation.  

Continue antibiotics and dressing changes.


2.  Status post left modified radical mastectomy on 02/23/2017.


3.  Hypertension.  Will continue the patient's antihypertensive medications 

from home.  Continue hydralazine p.r.n. for a systolic blood pressure above 170.


4.  Diabetes mellitus type 2.  Will continue the patient on NovoLog per 

moderate algorithm sliding scale.  


5.  Will continue morphine and Tylenol p.r.n. for pain and Zofran p.r.n. for 

nausea


Problems:  





Subjective


24 Hr Interval Summary


Free Text/Dictation


Patient has no complaints





Exam/Review of Systems


Vital Signs


Vitals





 Vital Signs








  Date Time  Temp Pulse Resp B/P Pulse Ox O2 Delivery O2 Flow Rate FiO2


 


3/18/17 08:16 98.0 64 18 133/77 98   


 


3/18/17 06:32      Room Air  


 


3/16/17 19:25       6.0 














 Intake and Output   


 


 3/17/17 3/17/17 3/18/17





 15:00 23:00 07:00


 


Intake Total 800 ml 1580 ml 1850 ml


 


Output Total   1200 ml


 


Balance 800 ml 1580 ml 650 ml











Exam


Constitutional:  well developed


Head:  atraumatic, normocephalic


Neck:  supple


Respiratory:  diminished breath sounds


Cardiovascular:  regular rate and rhythm


Gastrointestinal:  non-tender, soft





Results


Result Diagram:  


3/18/17 0415                                                                   

             3/16/17 1500





Results 24 hrs





Laboratory Tests








Test


  3/17/17


14:08 3/17/17


17:24 3/17/17


20:13 3/18/17


01:31


 


Bedside Glucose 248  H 179   257  H 204  














Test


  3/18/17


04:15 3/18/17


07:40 3/18/17


11:55 


 


 


Basophils # 0.1     


 


Basophils % 0.7     


 


Eosinophils # 0.2     


 


Eosinophils % 3.1     


 


Hematocrit 36.4  L   


 


Hemoglobin 12.2     


 


Lymphocytes # 2.5     


 


Lymphocytes % 35.1     


 


Mean Corpuscular Hemoglobin 29.1     


 


Mean Corpuscular Hemoglobin


Concent 33.5  


  


  


  


 


 


Mean Corpuscular Volume 86.9     


 


Mean Platelet Volume 9.7     


 


Monocytes # 0.6     


 


Monocytes % 8.1     


 


Neutrophils # 3.7     


 


Neutrophils % 52.6     


 


Nucleated Red Blood Cells # 0.0     


 


Nucleated Red Blood Cells % 0.0     


 


Platelet Count 391     


 


Red Blood Count 4.19  L   


 


Red Cell Distribution Width 12.2     


 


White Blood Count 7.0  #   


 


Bedside Glucose  245  H 337  H 











Medications


Medications





 Current Medications


Ondansetron HCl (Zofran Inj) 4 mg Q6H  PRN IV NAUSEA AND/OR VOMITING;  Start 3/

16/17 at 19:30


Morphine Sulfate 2 mg 2 mg Q1H  PRN IV PAIN;  Start 3/16/17 at 19:30


Acetaminophen 100 ml @  400 mls/hr Q6H  PRN IVPB PAIN Last administered on 3/18/

17at 06:50; Admin Dose 400 MLS/HR;  Start 3/16/17 at 19:30


Piperacillin Sod/ Tazobactam Sod 100 ml @  25 mls/hr TID@02,10,18 IVPB  Last 

administered on 3/18/17at 12:07; Admin Dose 25 MLS/HR;  Start 3/17/17 at 02:00


Sodium Chloride (1/2 NS) 1,000 ml @  100 mls/hr Q10H IV  Last administered on 3/

17/17at 17:21; Admin Dose 100 MLS/HR;  Start 3/16/17 at 22:30


Miscellaneous Information 1 ea NOTE XX ;  Start 3/16/17 at 22:30


Glucose (Glutose) 15 gm Q15M  PRN PO DECREASED GLUCOSE;  Start 3/16/17 at 22:30


Glucose (Glutose) 22.5 gm Q15M  PRN PO DECREASED GLUCOSE;  Start 3/16/17 at 22:

30


Dextrose (D50w Syringe) 25 ml Q15M  PRN IV DECREASED GLUCOSE;  Start 3/16/17 at 

22:30


Dextrose (D50w Syringe) 50 ml Q15M  PRN IV DECREASED GLUCOSE;  Start 3/16/17 at 

22:30


Glucagon (Glucagen) 1 mg Q15M  PRN IM DECREASED GLUCOSE;  Start 3/16/17 at 22:30


Glucose (Glutose) 15 gm Q15M  PRN BUCCAL DECREASED GLUCOSE;  Start 3/16/17 at 22

:30


Docusate Sodium (Colace) 100 mg BID PO  Last administered on 3/18/17at 08:25; 

Admin Dose 100 MG;  Start 3/17/17 at 11:00


Lisinopril (Zestril) 20 mg DAILY PO  Last administered on 3/18/17at 08:26; 

Admin Dose 20 MG;  Start 3/17/17 at 21:30


Clonidine (Catapres) 0.1 mg Q6H  PRN PO ELEVATED SYSTOLIC BP;  Start 3/17/17 at 

21:30


Senna (Senokot) 2 tab BID  PRN PO CONSTIPATION Last administered on 3/17/17at 21

:32; Admin Dose 2 TAB;  Start 3/17/17 at 21:30











PRIYA HENDERSON Mar 18, 2017 12:27 13-Jan-2017 18:46

## 2017-03-19 VITALS — RESPIRATION RATE: 16 BRPM | DIASTOLIC BLOOD PRESSURE: 71 MMHG | SYSTOLIC BLOOD PRESSURE: 162 MMHG

## 2017-03-19 VITALS — SYSTOLIC BLOOD PRESSURE: 134 MMHG | HEART RATE: 58 BPM | DIASTOLIC BLOOD PRESSURE: 63 MMHG | RESPIRATION RATE: 16 BRPM

## 2017-03-19 VITALS — SYSTOLIC BLOOD PRESSURE: 140 MMHG | RESPIRATION RATE: 16 BRPM | DIASTOLIC BLOOD PRESSURE: 65 MMHG

## 2017-03-19 VITALS — SYSTOLIC BLOOD PRESSURE: 162 MMHG | DIASTOLIC BLOOD PRESSURE: 77 MMHG | RESPIRATION RATE: 16 BRPM

## 2017-03-19 VITALS — DIASTOLIC BLOOD PRESSURE: 66 MMHG | RESPIRATION RATE: 18 BRPM | SYSTOLIC BLOOD PRESSURE: 127 MMHG | HEART RATE: 18 BPM

## 2017-03-19 RX ADMIN — DOCUSATE SODIUM SCH MG: 100 CAPSULE, LIQUID FILLED ORAL at 08:13

## 2017-03-19 RX ADMIN — ACYCLOVIR SCH MG: 400 TABLET ORAL at 23:22

## 2017-03-19 RX ADMIN — CEFAZOLIN SCH MLS/HR: 1 INJECTION, POWDER, FOR SOLUTION INTRAMUSCULAR; INTRAVENOUS at 17:37

## 2017-03-19 RX ADMIN — LISINOPRIL SCH MG: 20 TABLET ORAL at 08:14

## 2017-03-19 RX ADMIN — CEFAZOLIN SCH MLS/HR: 1 INJECTION, POWDER, FOR SOLUTION INTRAMUSCULAR; INTRAVENOUS at 23:22

## 2017-03-19 RX ADMIN — CALCIUM GLUCONATE SCH MLS/HR: 94 INJECTION, SOLUTION INTRAVENOUS at 00:30

## 2017-03-19 RX ADMIN — CALCIUM GLUCONATE SCH MLS/HR: 94 INJECTION, SOLUTION INTRAVENOUS at 10:36

## 2017-03-19 RX ADMIN — DOCUSATE SODIUM SCH MG: 100 CAPSULE, LIQUID FILLED ORAL at 20:23

## 2017-03-19 RX ADMIN — Medication PRN MLS/HR: at 22:29

## 2017-03-19 RX ADMIN — PIPERACILLIN SODIUM AND TAZOBACTAM SODIUM SCH MLS/HR: 3; .375 INJECTION, POWDER, LYOPHILIZED, FOR SOLUTION INTRAVENOUS at 10:36

## 2017-03-19 RX ADMIN — ACYCLOVIR SCH MG: 400 TABLET ORAL at 17:45

## 2017-03-19 RX ADMIN — PIPERACILLIN SODIUM AND TAZOBACTAM SODIUM SCH MLS/HR: 3; .375 INJECTION, POWDER, LYOPHILIZED, FOR SOLUTION INTRAVENOUS at 01:39

## 2017-03-19 NOTE — PN
DATE:  03/19/2017

 

 

SUBJECTIVE:  Feels better, status post incision and drainage of the left breast abscess, is status p
ost left modified radical mastectomy and axillary resection for cancer.

 

OBJECTIVE:

VITAL SIGNS:  Temperature 98, heart rate 58, respirations 16, blood pressure 162/71, saturation 97% 
on room air.

 

LABORATORIES:  No labs today.  Blood sugar 335.  

 

DRESSING CHANGE:  Wound relatively clean.  Still has some brownish discharge.  Dressing was changed.

 

ASSESSMENT:  The patient with left breast incisional abscess postoperation for modified radical mast
ectomy and axillary dissection for cancer.  The culture has grown staph.  Today, the antibiotic was 
changed to clindamycin by infectious disease.

 

PLAN:  Continue IV antibiotics for as long as is needed and advised by infectious disease.

 

 

Dictated By: WESLY DOMÍNGUEZ MD

 

PS/LUIS ALBERTO

DD:    03/19/2017 16:07:58

DT:    03/19/2017 18:29:49

Conf#: [id number]

DID#:  [id number]

## 2017-03-19 NOTE — PN
Date/Time of Note


Date/Time of Note


DATE: 3/19/17 


TIME: 12:04





Assessment/Plan


VTE Prophylaxis


VTE Prophylaxis Intervention:  other





Lines/Catheters


IV C[facility]ter Type ([facility]):  Peripheral IV


Urinary Cath still in place:  No





Assessment/Plan


Chief Complaint/Hosp Course


1.  Chest wall abscess, status post incision and drainage of the left chest 

wall and wound abscess.  Will continue to follow up surgical recommendations. 

We will ask Dr. Dreyer to see the patient in infectious disease consultation.  

Continue antibiotics and dressing changes.


2.  Status post left modified radical mastectomy on 02/23/2017.


3.  Hypertension.  Will continue the patient's antihypertensive medications 

from home.  Continue hydralazine p.r.n. for a systolic blood pressure above 170.


4.  Diabetes mellitus type 2.  Will continue the patient on NovoLog per 

moderate algorithm sliding scale.  


5.  Will continue morphine and Tylenol p.r.n. for pain and Zofran p.r.n. for 

nausea


Problems:  





Subjective


24 Hr Interval Summary


Free Text/Dictation


Patient has no complaints





Exam/Review of Systems


Vital Signs


Vitals





 Vital Signs








  Date Time  Temp Pulse Resp B/P Pulse Ox O2 Delivery O2 Flow Rate FiO2


 


3/19/17 08:51 98.0 16 16 162/71 97   


 


3/19/17 08:12      Room Air  


 


3/16/17 19:25       6.0 














 Intake and Output   


 


 3/18/17 3/18/17 3/19/17





 15:00 23:00 07:00


 


Intake Total 100 ml 2150 ml 2250 ml


 


Output Total  1200 ml 2000 ml


 


Balance 100 ml 950 ml 250 ml











Exam


Constitutional:  well developed


Head:  atraumatic, normocephalic


Neck:  supple


Respiratory:  clear to auscultation


Cardiovascular:  regular rate and rhythm


Gastrointestinal:  non-tender, soft


Extremities:  normal pulses





Results


Result Diagram:  


3/18/17 0415                                                                   

             3/16/17 1500





Results 24 hrs





Laboratory Tests








Test


  3/18/17


17:00 3/18/17


20:08 3/19/17


01:42


 


Bedside Glucose 244  H 386  H 335  H











Medications


Medications





 Current Medications


Ondansetron HCl (Zofran Inj) 4 mg Q6H  PRN IV NAUSEA AND/OR VOMITING;  Start 3/

16/17 at 19:30


Morphine Sulfate 2 mg 2 mg Q1H  PRN IV PAIN;  Start 3/16/17 at 19:30


Acetaminophen 100 ml @  400 mls/hr Q6H  PRN IVPB PAIN Last administered on 3/18/

17at 21:20; Admin Dose 400 MLS/HR;  Start 3/16/17 at 19:30


Piperacillin Sod/ Tazobactam Sod 100 ml @  25 mls/hr TID@02,10,18 IVPB  Last 

administered on 3/19/17at 10:36; Admin Dose 25 MLS/HR;  Start 3/17/17 at 02:00


Sodium Chloride (1/2 NS) 1,000 ml @  100 mls/hr Q10H IV  Last administered on 3/

19/17at 10:36; Admin Dose 100 MLS/HR;  Start 3/16/17 at 22:30


Miscellaneous Information 1 ea NOTE XX ;  Start 3/16/17 at 22:30


Glucose (Glutose) 15 gm Q15M  PRN PO DECREASED GLUCOSE;  Start 3/16/17 at 22:30


Glucose (Glutose) 22.5 gm Q15M  PRN PO DECREASED GLUCOSE;  Start 3/16/17 at 22:

30


Dextrose (D50w Syringe) 25 ml Q15M  PRN IV DECREASED GLUCOSE;  Start 3/16/17 at 

22:30


Dextrose (D50w Syringe) 50 ml Q15M  PRN IV DECREASED GLUCOSE;  Start 3/16/17 at 

22:30


Glucagon (Glucagen) 1 mg Q15M  PRN IM DECREASED GLUCOSE;  Start 3/16/17 at 22:30


Glucose (Glutose) 15 gm Q15M  PRN BUCCAL DECREASED GLUCOSE;  Start 3/16/17 at 22

:30


Docusate Sodium (Colace) 100 mg BID PO  Last administered on 3/19/17at 08:13; 

Admin Dose 100 MG;  Start 3/17/17 at 11:00


Lisinopril (Zestril) 20 mg DAILY PO  Last administered on 3/19/17at 08:14; 

Admin Dose 20 MG;  Start 3/17/17 at 21:30


Clonidine (Catapres) 0.1 mg Q6H  PRN PO ELEVATED SYSTOLIC BP;  Start 3/17/17 at 

21:30


Senna 2 tab 2 tab BID  PRN PO CONSTIPATION Last administered on 3/17/17at 21:32

; Admin Dose 2 TAB;  Start 3/17/17 at 21:30


Vancomycin HCl/ Sodium Chloride (Vancocin/NS) 150 ml @  75 mls/hr Q12H IVPB  

Last administered on 3/19/17at 06:15; Admin Dose 75 MLS/HR;  Start 3/19/17 at 06

:00











PRIYA HENDERSON 19, 2017 12:05

## 2017-03-19 NOTE — PN
DATE:  03/19/2017

 

 

SUBJECTIVE:  No acute changes overnight.  The patient is alert, looks comfortable.  Denies pain, dis
comfort.  No fevers.

 

LABS:  No labs.

 

MICROBIOLOGY:  Left breast wound culture growing staph species, resistant only to penicillin G and B
actrim.

 

ANTIMICROBIALS:  The patient is on:

1.  Vancomycin.

2.  Zosyn.

 

PHYSICAL EXAMINATION:

GENERAL:  This is a well-nourished, well-developed, elderly  woman who is alert, in no distr
ess.

HEENT:  Head atraumatic, normocephalic.  Sclerae anicteric.  Buccal mucosa pink.

NECK:  Supple.

CHEST:  Rise symmetrical.  Breath sounds clear.

HEART:  S1, S2.

ABDOMEN:  Soft, bowel tones present.

EXTREMITIES:  Without cyanosis.

SKIN:  With left breast dressing intact.

 

ASSESSMENT:

1.  Left chest wall abscess, status post incision and drainage.

2.  Status post left modified radical mastectomy with axillary dissection for advanced cancer on  02
/23/2017.

3.  Diabetes.

4.  Hypertension.

 

PLAN:  The patient remains stable.  She is being followed by Dr. Mc and we are going to change a
ntibiotics to IV clindamycin.  Add probiotics.  Continue management as per primary team and consulta
nts.

 

 

Dictated By: TYLER CARBAJAL NP for JERROLD DREYER MD NI/LUIS ALBERTO

DD:    03/19/2017 14:25:59

DT:    03/19/2017 18:49:23

Conf#: [id number]

DID#:  [id number]

## 2017-03-19 NOTE — CONS
DATE OF ADMISSION: 03/16/2017

DATE OF CONSULTATION:  03/17/2017

 

 

 

REQUESTING PHYSICIAN:  Moody Lopez MD  

 

I am seeing this patient for Dr. Jerrold Dreyer.

 

HISTORY OF PRESENT ILLNESS:  The patient is a 62[facility] is status po
stoperative 02/23/2017, left modified radical mastectomy, who presents with swelling, drainage, and 
abscess of the operative site.  At the previously mentioned time, she had excision of an invasive du
ctal carcinoma, grade II.  There were no lymph nodes involved in 5/5 lymph nodes taken and there was
 no involvement of margin for this 4 cm tumor.  The patient had noted drainage recently without feve
r or chills.  She was admitted to the hospital for further evaluation and treatment.  Her white coun
t was 8900, hemoglobin 12.2 grams.  It was noted at that time that she had a crusting lesion on her 
upper lip involving the upper limbus of the mucous membrane and the skin of the left upper lip.  She
 said that she has had an outbreak about a year or two of this before.  The patient had culture of t
he drainage, which initially grew Staphylococcus species and then grew MSSA, which was resistant to 
Bactrim and penicillin and sensitive to all the other anti-staphylococcal drugs tested.  When she ca
me into the hospital, she was begun treatment with vancomycin and Zosyn.

 

PAST MEDICAL HISTORY:  Diabetes, hypertension.  

 

ALLERGIES:  SHE HAS NO KNOWN ALLERGIES.

 

MEDICATIONS:  Consist of:  

1.  Lisinopril 20 mg p.o. daily.

2.  Docusate sodium 100 mg b.i.d. 

3.  Aspartate insulin with meals subcutaneously.

4.  Metformin 500 mg b.i.d. with meals. 

5.  Acidophilus 3 times a day 1 tablet.  

 

REVIEW OF SYSTEMS:  Taken in detail and is essentially negative except for diabetes and hypertension
 and herpes labialis.

 

PHYSICAL EXAMINATION:

GENERAL:  A well-developed, chronically ill-appearing  female, lying in bed with the head of
 her bed elevated 30 degrees.  

HEENT:  She has a linear crusting lesion straddling the border of the mucous membrane and skin of he
r left side of her upper lip.  The mouth has moist mucous membranes.

NECK:  There is no jugular venous distention.

CHEST:  Clear to auscultation.  The patient has a bulky bandage with a large Gates catheter tube albina
ining an operative wound entering at the anterior portion of the lower axilla into the operative sonny
ast cavity.  There is a large amount of serosanguineous drainage on the bandage.

ABDOMEN:  Soft.  No palpable organs or masses.

EXTREMITIES:  Reveal no edema, cyanosis, or clubbing.

 

INITIAL IMPRESSION:  

1.  Probable methicillin-sensitive Staphylococcus aureus abscess, left breast.  

2.  Operative wound infection status postoperative mastectomy for invasive ductal carcinoma, grade I
I.

3.  Diabetes mellitus.  

4.  Hypertension.

 

RECOMMENDATIONS:  After the cultures have been clarified, I would treat the patient with intravenous
 cefazolin 1 gram IV q.8h. and then continue for a total of 2 to 3 weeks after discharge with Keflex
 250 mg q.i.d.  I recommend treating the herpetic lesion with acyclovir 400 mg 2 to 3 times a day fo
r 7 days and I gave the patient a prescription for a larger number should she have a recurrent outbr
eak.

 

 

Dictated By: E. DUANE CARMALT MD for JERROLD DREYER MD

 

EC/NTS

DD:    03/19/2017 16:44:52

DT:    03/19/2017 18:48:40

Conf#: [id number]

DID#:  [id number]

CC: MOODY LOPEZ MD;*EndCC*

## 2017-03-20 VITALS — RESPIRATION RATE: 18 BRPM | SYSTOLIC BLOOD PRESSURE: 154 MMHG | DIASTOLIC BLOOD PRESSURE: 86 MMHG

## 2017-03-20 VITALS — SYSTOLIC BLOOD PRESSURE: 145 MMHG | DIASTOLIC BLOOD PRESSURE: 70 MMHG | RESPIRATION RATE: 18 BRPM

## 2017-03-20 LAB
ADD SCAN DIFF: NO
ANION GAP SERPL CALC-SCNC: 15 MMOL/L (ref 8–16)
BASOPHILS # BLD AUTO: 0 10^3/UL (ref 0–0.1)
BASOPHILS NFR BLD: 0.5 % (ref 0–2)
BUN SERPL-MCNC: 9 MG/DL (ref 7–20)
CALCIUM SERPL-MCNC: 9.5 MG/DL (ref 8.4–10.2)
CHLORIDE SERPL-SCNC: 101 MMOL/L (ref 97–110)
CO2 SERPL-SCNC: 27 MMOL/L (ref 21–31)
CREAT SERPL-MCNC: 0.42 MG/DL (ref 0.44–1)
EOSINOPHIL # BLD: 0.3 10^3/UL (ref 0–0.5)
EOSINOPHIL NFR BLD: 3.3 % (ref 0–7)
ERYTHROCYTE [DISTWIDTH] IN BLOOD BY AUTOMATED COUNT: 12 % (ref 11.5–14.5)
GLUCOSE SERPL-MCNC: 231 MG/DL (ref 70–220)
HCT VFR BLD CALC: 35.3 % (ref 37–47)
HGB BLD-MCNC: 12 G/DL (ref 12–16)
LYMPHOCYTES # BLD AUTO: 2.8 10^3/UL (ref 0.8–2.9)
LYMPHOCYTES NFR BLD AUTO: 35.6 % (ref 15–51)
MCH RBC QN AUTO: 29.1 PG (ref 29–33)
MCHC RBC AUTO-ENTMCNC: 34 G/DL (ref 32–37)
MCV RBC AUTO: 85.7 FL (ref 82–101)
MONOCYTES # BLD: 0.4 10^3/UL (ref 0.3–0.9)
MONOCYTES NFR BLD: 5.1 % (ref 0–11)
NEUTROPHILS # BLD: 4.3 10^3/UL (ref 1.6–7.5)
NEUTROPHILS NFR BLD AUTO: 55 % (ref 39–77)
NRBC # BLD MANUAL: 0 10^3/UL (ref 0–0)
NRBC BLD QL: 0 /100WBC (ref 0–0)
PLATELET # BLD: 387 10^3/UL (ref 140–415)
PMV BLD AUTO: 9.8 FL (ref 7.4–10.4)
POTASSIUM SERPL-SCNC: 4.1 MMOL/L (ref 3.5–5.1)
RBC # BLD AUTO: 4.12 10^6/UL (ref 4.2–5.4)
SODIUM SERPL-SCNC: 139 MMOL/L (ref 135–144)
WBC # BLD AUTO: 7.8 10^3/UL (ref 4.8–10.8)

## 2017-03-20 RX ADMIN — DOCUSATE SODIUM SCH MG: 100 CAPSULE, LIQUID FILLED ORAL at 20:41

## 2017-03-20 RX ADMIN — DOCUSATE SODIUM SCH MG: 100 CAPSULE, LIQUID FILLED ORAL at 09:28

## 2017-03-20 RX ADMIN — CEFAZOLIN SCH MLS/HR: 1 INJECTION, POWDER, FOR SOLUTION INTRAMUSCULAR; INTRAVENOUS at 06:01

## 2017-03-20 RX ADMIN — ACYCLOVIR SCH MG: 400 TABLET ORAL at 09:28

## 2017-03-20 RX ADMIN — ACYCLOVIR SCH MG: 400 TABLET ORAL at 20:41

## 2017-03-20 RX ADMIN — CEFAZOLIN SCH MLS/HR: 1 INJECTION, POWDER, FOR SOLUTION INTRAMUSCULAR; INTRAVENOUS at 14:31

## 2017-03-20 RX ADMIN — CEFAZOLIN SCH MLS/HR: 1 INJECTION, POWDER, FOR SOLUTION INTRAMUSCULAR; INTRAVENOUS at 21:38

## 2017-03-20 RX ADMIN — ACYCLOVIR SCH MG: 400 TABLET ORAL at 12:36

## 2017-03-20 RX ADMIN — LISINOPRIL SCH MG: 20 TABLET ORAL at 09:28

## 2017-03-20 NOTE — PN
DATE:  03/20/2017

 

 

SUBJECTIVE:  Patient is alert, sitting in a chair, looks comfortable, denies pain, discomfort.  No f
ranjana.

 

MICROBIOLOGY:  Wound culture grew oxacillin-sensitive Staphylococcus aureus.  Anaerobic culture was 
negative.

 

ANTIMICROBIALS:  The patient was started on Ancef.

 

PHYSICAL EXAMINATION:

GENERAL:  This is a well-developed, well-nourished elderly woman who is alert, in no distress.

HEENT:  Head atraumatic, normocephalic.  Sclerae anicteric.  Buccal mucosa pink.

NECK:  Supple.

CHEST:  Rise symmetrical.  Breath sounds clear.

HEART:  S1, S2.

ABDOMEN:  Soft.  Bowel tones present.

EXTREMITIES:  Without cyanosis.

 

ASSESSMENT:

1.  Status post left chest wall abscess incision and drainage.

2.  Status post left modified radical mastectomy with axillary dissection for advanced cancer on 02/
23/2017

3.  Diabetes.

 

PLAN:  The patient remains stable on appropriate antimicrobials.  Continue present care, local wound
 care as per surgical recommendations.

 

 

Dictated By: TYLER CARBAJAL NP for JERROLD DREYER MD NI/LUIS ALBERTO

DD:    03/20/2017 13:19:00

DT:    03/20/2017 20:50:28

Conf#: [id number]

DID#:  [id number]

## 2017-03-20 NOTE — PN
DATE:  03/20/2017

 

 

SUBJECTIVE:  Patient feels better and is receiving antibiotic Ancef which was changed by Dr. Maddox
 from infectious disease today.

 

OBJECTIVE

VITAL SIGNS:  Temperature 98.2, 61, 18, 145/70, 97% on room air.  

 

LABORATORY:  Fasting blood sugar 231.  Sodium, potassium, BUN and creatinine are normal.

 

LABORATORY DATA:  WBC 7800 with 55% segmented.

 

ASSESSMENT:  Patient is status post modified radical mastectomy and axillary resection about 4 weeks
 ago.  The patient presented to the office with swelling and pain and tenderness and chills and was 
found to have an abscess of the incisional wound.  So, she was taken to the OR by Dr. Lopez to have 
the abscess drained.  An incision was made and a drain was placed.  The patient is receiving antibio
tics.  Since Dr. Maddox, infectious disease colleague, changed with the antibiotic to Ancef. Cultur
e has been staph.  The patient is to continue antibiotic IV as long as the surgeon recommends it.  T
hen the patient can be switched to p.o. and be discharged.

 

 

Dictated By: WESLY DOMÍNGUEZ MD

 

PS/NTS

DD:    03/20/2017 11:21:19

DT:    03/20/2017 17:56:17

Conf#: [id number]

DID#:  [id number]

## 2017-03-20 NOTE — PN
Date/Time of Note


Date/Time of Note


DATE: 3/20/17 


TIME: 17:57





Assessment/Plan


VTE Prophylaxis


VTE Prophylaxis Intervention:  SCD's





Lines/Catheters


IV Catheter Type (from [facility]):  Saline Lock


Urinary Cath still in place:  No





Assessment/Plan


Chief Complaint/Hosp Course


 ASSESSMENT AND PLAN:


1.  Chest wall abscess, status post incision and drainage of the left chest 

wall and wound abscess by Dr Lopez.  Dr. Dreyer is following in infectious 

disease consultation.  Continue antibiotics and dressing changes.


2.  Status post left modified radical mastectomy on 02/23/2017.


3.  Hypertension.  Continue Lisinopril.  Continue hydralazine p.r.n. for a 

systolic blood pressure above 170.


4.  Diabetes mellitus type 2. Continue the patient on Lantus and NovoLog per 

moderate algorithm sliding scale.  





Further recommendations based on clinical course.  Plan of care discussed with 

Dr. Patel.


Problems:  





Exam/Review of Systems


Vital Signs


Vitals





 Vital Signs








  Date Time  Temp Pulse Resp B/P Pulse Ox O2 Delivery O2 Flow Rate FiO2


 


3/20/17 08:36 98.2 61 18 145/70 97   


 


3/19/17 08:12      Room Air  


 


3/16/17 19:25       6.0 














 Intake and Output   


 


 3/19/17 3/19/17 3/20/17





 15:00 23:00 07:00


 


Intake Total  1750 ml 900 ml


 


Output Total   1200 ml


 


Balance  1750 ml -300 ml











Exam


PHYSICAL ASSESSMENT:


GENERAL:  Well-developed, well-nourished female, in no acute distress.


HEENT:  Head is atraumatic, normocephalic.  Pupils are equal, round, react to 

light and accommodation.  Oral mucosa is pink and moist.


NECK:  Supple.  No cervical lymphadenopathy, no thyromegaly.


CHEST:  Lungs clear bilaterally.  There are no rhonchi, wheezes or rales noted.

  The patient has a left chest dry, clean and intact dressing.


CARDIOVASCULAR:  Normal S1, S2.  No murmurs, gallops, clicks, or rubs noted.


ABDOMEN:  Round, soft, nondistended, nontender.  Bowel sounds are present in 

all 4 quadrants.  There is no guarding or rebound tenderness.


EXTREMITIES:  There is no edema, clubbing or cyanosis.  Pulses are equal 

bilaterally at 2+.


SKIN:  There is no rash or petechiae noted.


NEUROLOGIC:  The patient is awake, alert and oriented x4.





Results


Result Diagram:  


3/20/17 0425                                                                   

             3/20/17 0425





Results 24 hrs





Laboratory Tests








Test


  3/19/17


20:13 3/20/17


02:03 3/20/17


04:25 3/20/17


08:46


 


Bedside Glucose 229  H 211    247  H


 


Anion Gap   15   


 


Basophils #   0.0   


 


Basophils %   0.5   


 


Blood Urea Nitrogen   9   


 


Calcium Level   9.5   


 


Carbon Dioxide Level   27   


 


Chloride Level   101   


 


Creatinine   0.42  L 


 


Eosinophils #   0.3   


 


Eosinophils %   3.3   


 


Glucose Level   231  H 


 


Hematocrit   35.3  L 


 


Hemoglobin   12.0   


 


Lymphocytes #   2.8   


 


Lymphocytes %   35.6   


 


Mean Corpuscular Hemoglobin   29.1   


 


Mean Corpuscular Hemoglobin


Concent 


  


  34.0  


  


 


 


Mean Corpuscular Volume   85.7   


 


Mean Platelet Volume   9.8   


 


Monocytes #   0.4   


 


Monocytes %   5.1   


 


Neutrophils #   4.3   


 


Neutrophils %   55.0   


 


Nucleated Red Blood Cells #   0.0   


 


Nucleated Red Blood Cells %   0.0   


 


Platelet Count   387   


 


Potassium Level   4.1   


 


Red Blood Count   4.12  L 


 


Red Cell Distribution Width   12.0   


 


Sodium Level   139   


 


White Blood Count   7.8   














Test


  3/20/17


12:39 3/20/17


17:43 


  


 


 


Bedside Glucose 277  H 123    











Medications


Medications





 Current Medications


Ondansetron HCl (Zofran Inj) 4 mg Q6H  PRN IV NAUSEA AND/OR VOMITING;  Start 3/

16/17 at 19:30


Morphine Sulfate 2 mg 2 mg Q1H  PRN IV PAIN;  Start 3/16/17 at 19:30


Acetaminophen (Ofirmev 1000mg/ 100ml Iv) 100 ml @  400 mls/hr Q6H  PRN IVPB 

PAIN Last administered on 3/19/17at 22:29; Admin Dose 400 MLS/HR;  Start 3/16/

17 at 19:30


Miscellaneous Information 1 ea NOTE XX ;  Start 3/16/17 at 22:30


Glucose (Glutose) 15 gm Q15M  PRN PO DECREASED GLUCOSE;  Start 3/16/17 at 22:30


Glucose (Glutose) 22.5 gm Q15M  PRN PO DECREASED GLUCOSE;  Start 3/16/17 at 22:

30


Dextrose (D50w Syringe) 25 ml Q15M  PRN IV DECREASED GLUCOSE;  Start 3/16/17 at 

22:30


Dextrose (D50w Syringe) 50 ml Q15M  PRN IV DECREASED GLUCOSE;  Start 3/16/17 at 

22:30


Glucagon (Glucagen) 1 mg Q15M  PRN IM DECREASED GLUCOSE;  Start 3/16/17 at 22:30


Glucose (Glutose) 15 gm Q15M  PRN BUCCAL DECREASED GLUCOSE;  Start 3/16/17 at 22

:30


Docusate Sodium (Colace) 100 mg BID PO  Last administered on 3/20/17at 09:28; 

Admin Dose 100 MG;  Start 3/17/17 at 11:00


Lisinopril (Zestril) 20 mg DAILY PO  Last administered on 3/20/17at 09:28; 

Admin Dose 20 MG;  Start 3/17/17 at 21:30


Clonidine (Catapres) 0.1 mg Q6H  PRN PO ELEVATED SYSTOLIC BP;  Start 3/17/17 at 

21:30


Senna (Senokot) 2 tab BID  PRN PO CONSTIPATION Last administered on 3/17/17at 21

:32; Admin Dose 2 TAB;  Start 3/17/17 at 21:30


Lactobacillus Acidoph/Bulgaricus (Floranex) 1 tab TID PO  Last administered on 3

/20/17at 12:36; Admin Dose 1 TAB;  Start 3/19/17 at 21:00


Acyclovir 400 mg 400 mg TID PO  Last administered on 3/20/17at 12:36; Admin 

Dose 400 MG;  Start 3/19/17 at 17:00;  Stop 3/26/17 at 16:59


Cefazolin Sodium (Ancef 1 Gm/50 ml (Pmx)) 50 ml @  100 mls/hr Q8 IVPB  Last 

administered on 3/20/17at 14:31; Admin Dose 100 MLS/HR;  Start 3/19/17 at 16:30

;  Stop 3/26/17 at 23:55


Insulin Glargine (Lantus) 10 unit DAILY@08 SC ;  Start 3/21/17 at 08:00











REGINA RAZO Mar 20, 2017 18:00

## 2017-03-21 VITALS — RESPIRATION RATE: 16 BRPM | DIASTOLIC BLOOD PRESSURE: 60 MMHG | SYSTOLIC BLOOD PRESSURE: 131 MMHG

## 2017-03-21 VITALS — RESPIRATION RATE: 20 BRPM | DIASTOLIC BLOOD PRESSURE: 66 MMHG | SYSTOLIC BLOOD PRESSURE: 141 MMHG

## 2017-03-21 LAB
ADD SCAN DIFF: NO
ANION GAP SERPL CALC-SCNC: 15 MMOL/L (ref 8–16)
BASOPHILS # BLD AUTO: 0 10^3/UL (ref 0–0.1)
BASOPHILS NFR BLD: 0.4 % (ref 0–2)
BUN SERPL-MCNC: 10 MG/DL (ref 7–20)
CALCIUM SERPL-MCNC: 9.4 MG/DL (ref 8.4–10.2)
CHLORIDE SERPL-SCNC: 100 MMOL/L (ref 97–110)
CO2 SERPL-SCNC: 26 MMOL/L (ref 21–31)
CREAT SERPL-MCNC: 0.43 MG/DL (ref 0.44–1)
EOSINOPHIL # BLD: 0.3 10^3/UL (ref 0–0.5)
EOSINOPHIL NFR BLD: 2.8 % (ref 0–7)
ERYTHROCYTE [DISTWIDTH] IN BLOOD BY AUTOMATED COUNT: 12.2 % (ref 11.5–14.5)
GLUCOSE SERPL-MCNC: 212 MG/DL (ref 70–220)
HCT VFR BLD CALC: 35 % (ref 37–47)
HGB BLD-MCNC: 11.9 G/DL (ref 12–16)
LYMPHOCYTES # BLD AUTO: 3 10^3/UL (ref 0.8–2.9)
LYMPHOCYTES NFR BLD AUTO: 33.9 % (ref 15–51)
MCH RBC QN AUTO: 29.4 PG (ref 29–33)
MCHC RBC AUTO-ENTMCNC: 34 G/DL (ref 32–37)
MCV RBC AUTO: 86.4 FL (ref 82–101)
MONOCYTES # BLD: 0.5 10^3/UL (ref 0.3–0.9)
MONOCYTES NFR BLD: 5.4 % (ref 0–11)
NEUTROPHILS # BLD: 5.1 10^3/UL (ref 1.6–7.5)
NEUTROPHILS NFR BLD AUTO: 57.1 % (ref 39–77)
NRBC # BLD MANUAL: 0 10^3/UL (ref 0–0)
NRBC BLD QL: 0 /100WBC (ref 0–0)
PLATELET # BLD: 352 10^3/UL (ref 140–415)
PMV BLD AUTO: 10.1 FL (ref 7.4–10.4)
POTASSIUM SERPL-SCNC: 4.1 MMOL/L (ref 3.5–5.1)
RBC # BLD AUTO: 4.05 10^6/UL (ref 4.2–5.4)
SODIUM SERPL-SCNC: 137 MMOL/L (ref 135–144)
WBC # BLD AUTO: 8.9 10^3/UL (ref 4.8–10.8)

## 2017-03-21 RX ADMIN — ACYCLOVIR SCH MG: 400 TABLET ORAL at 20:32

## 2017-03-21 RX ADMIN — ACYCLOVIR SCH MG: 400 TABLET ORAL at 08:59

## 2017-03-21 RX ADMIN — CEFAZOLIN SCH MLS/HR: 1 INJECTION, POWDER, FOR SOLUTION INTRAMUSCULAR; INTRAVENOUS at 14:20

## 2017-03-21 RX ADMIN — LISINOPRIL SCH MG: 20 TABLET ORAL at 08:59

## 2017-03-21 RX ADMIN — CEFAZOLIN SCH MLS/HR: 1 INJECTION, POWDER, FOR SOLUTION INTRAMUSCULAR; INTRAVENOUS at 21:20

## 2017-03-21 RX ADMIN — Medication PRN MLS/HR: at 22:22

## 2017-03-21 RX ADMIN — DOCUSATE SODIUM SCH MG: 100 CAPSULE, LIQUID FILLED ORAL at 20:32

## 2017-03-21 RX ADMIN — DOCUSATE SODIUM SCH MG: 100 CAPSULE, LIQUID FILLED ORAL at 08:59

## 2017-03-21 RX ADMIN — ACYCLOVIR SCH MG: 400 TABLET ORAL at 12:34

## 2017-03-21 RX ADMIN — CEFAZOLIN SCH MLS/HR: 1 INJECTION, POWDER, FOR SOLUTION INTRAMUSCULAR; INTRAVENOUS at 05:44

## 2017-03-21 NOTE — PN
DATE:  03/21/2017

 

 

SUBJECTIVE:  No acute events.  The patient is alert, ambulating in the cho.  Looks comfortable, no 
fevers.  

 

She is on IV Ancef.

 

PHYSICAL EXAMINATION:

GENERAL:  Well-developed, elderly woman who is alert, in no distress.

HEENT:  Head atraumatic, normocephalic.  Sclerae anicteric.  Buccal mucosa pink.

NECK:  Supple.

CHEST:  Rise symmetrical.  Breath sounds clear.

HEART:  S1, S2.

ABDOMEN:  Soft, bowel sounds present.

EXTREMITIES:  No cyanosis.

 

ASSESSMENT:

1.  Status post left chest wall abscess incision and drainage.

2.  History of mastectomy on 02/23/2017 of the left breast.

3.  Diabetes.

4.  Hypertension.

 

PLAN:  The patient remains stable.  Continue present care, antibiotics.  Anticipate discharge on ora
l Keflex when patient is cleared by surgery.

 

 

Dictated By: TYLER CARBAJAL NP for JERROLD DREYER MD NI/NTS

DD:    03/21/2017 13:40:07

DT:    03/21/2017 14:13:13

Conf#: [id number]

DID#:  [id number]

## 2017-03-21 NOTE — PN
Date/Time of Note


Date/Time of Note


DATE: 3/21/17 


TIME: 16:42





Assessment/Plan


VTE Prophylaxis


VTE Prophylaxis Intervention:  SCD's





Lines/Catheters


IV Catheter Type (from [facility]):  Saline Lock


Urinary Cath still in place:  No





Assessment/Plan


Chief Complaint/Hosp Course


 ASSESSMENT AND PLAN:


1.  Chest wall abscess, status post incision and drainage of the left chest 

wall and wound abscess by Dr Lopez.  Dr. Dreyer is following in infectious 

disease consultation.  Continue antibiotics and dressing changes.


2.  Status post left modified radical mastectomy on 02/23/2017.


3.  Hypertension.  Continue Lisinopril.  Continue hydralazine p.r.n. for a 

systolic blood pressure above 170.


4.  Diabetes mellitus type 2. Continue the patient on Lantus and NovoLog per 

moderate algorithm sliding scale.  





Further recommendations based on clinical course.  Plan of care discussed with 

Dr. Patel.


Problems:  





Subjective


24 Hr Interval Summary


Free Text/Dictation


Patient remains afebrile, patient stated the pain is well controlled, continue 

wound care per surgery recommendation.





Exam/Review of Systems


Vital Signs


Vitals





 Vital Signs








  Date Time  Temp Pulse Resp B/P Pulse Ox O2 Delivery O2 Flow Rate FiO2


 


3/21/17 08:12 98.7 85 16 131/60 95   


 


3/19/17 08:12      Room Air  














 Intake and Output   


 


 3/20/17 3/20/17 3/21/17





 14:59 22:59 06:59


 


Intake Total  1390 ml 890 ml


 


Output Total  1100 ml 1150 ml


 


Balance  290 ml -260 ml











Exam


PHYSICAL ASSESSMENT:


GENERAL:  Well-developed, well-nourished female, in no acute distress.


HEENT:  Head is atraumatic, normocephalic.  PERRLA.


NECK:  Supple.  No cervical lymphadenopathy, no thyromegaly.


CHEST:  Lungs clear bilaterally.  Left breast, s/p surgery.


CARDIOVASCULAR:  Normal S1, S2.  No murmurs, gallops, clicks, or rubs noted.


ABDOMEN:  Round, soft, nondistended, nontender.  Bowel sounds are present in 

all 4 quadrants.  There is no guarding or rebound tenderness.


EXTREMITIES:  There is no edema, clubbing or cyanosis.  Pulses are equal 

bilaterally at 2+.


SKIN:  There is no rash or petechiae noted.


NEUROLOGIC:  The patient is awake, alert and oriented x4.





Results


Result Diagram:  


3/21/17 0435                                                                   

             3/21/17 0435





Results 24 hrs





Laboratory Tests








Test


  3/20/17


17:43 3/20/17


20:03 3/21/17


04:35 3/21/17


09:02


 


Bedside Glucose 123   262  H  233  H


 


Anion Gap   15   


 


Basophils #   0.0   


 


Basophils %   0.4   


 


Blood Urea Nitrogen   10   


 


Calcium Level   9.4   


 


Carbon Dioxide Level   26   


 


Chloride Level   100   


 


Creatinine   0.43  L 


 


Eosinophils #   0.3   


 


Eosinophils %   2.8   


 


Glucose Level   212   


 


Hematocrit   35.0  L 


 


Hemoglobin   11.9  L 


 


Lymphocytes #   3.0  H 


 


Lymphocytes %   33.9   


 


Mean Corpuscular Hemoglobin   29.4   


 


Mean Corpuscular Hemoglobin


Concent 


  


  34.0  


  


 


 


Mean Corpuscular Volume   86.4   


 


Mean Platelet Volume   10.1   


 


Monocytes #   0.5   


 


Monocytes %   5.4   


 


Neutrophils #   5.1   


 


Neutrophils %   57.1   


 


Nucleated Red Blood Cells #   0.0   


 


Nucleated Red Blood Cells %   0.0   


 


Platelet Count   352   


 


Potassium Level   4.1   


 


Red Blood Count   4.05  L 


 


Red Cell Distribution Width   12.2   


 


Sodium Level   137   


 


White Blood Count   8.9   














Test


  3/21/17


12:33 


  


  


 


 


Bedside Glucose 321  H   











Medications


Medications





 Current Medications


Ondansetron HCl (Zofran Inj) 4 mg Q6H  PRN IV NAUSEA AND/OR VOMITING;  Start 3/

16/17 at 19:30


Morphine Sulfate 2 mg 2 mg Q1H  PRN IV PAIN;  Start 3/16/17 at 19:30


Acetaminophen (Ofirmev 1000mg/ 100ml Iv) 100 ml @  400 mls/hr Q6H  PRN IVPB 

PAIN Last administered on 3/19/17at 22:29; Admin Dose 400 MLS/HR;  Start 3/16/

17 at 19:30


Miscellaneous Information 1 ea NOTE XX ;  Start 3/16/17 at 22:30


Glucose (Glutose) 15 gm Q15M  PRN PO DECREASED GLUCOSE;  Start 3/16/17 at 22:30


Glucose (Glutose) 22.5 gm Q15M  PRN PO DECREASED GLUCOSE;  Start 3/16/17 at 22:

30


Dextrose (D50w Syringe) 25 ml Q15M  PRN IV DECREASED GLUCOSE;  Start 3/16/17 at 

22:30


Dextrose (D50w Syringe) 50 ml Q15M  PRN IV DECREASED GLUCOSE;  Start 3/16/17 at 

22:30


Glucagon (Glucagen) 1 mg Q15M  PRN IM DECREASED GLUCOSE;  Start 3/16/17 at 22:30


Glucose (Glutose) 15 gm Q15M  PRN BUCCAL DECREASED GLUCOSE;  Start 3/16/17 at 22

:30


Docusate Sodium (Colace) 100 mg BID PO  Last administered on 3/21/17at 08:59; 

Admin Dose 100 MG;  Start 3/17/17 at 11:00


Lisinopril (Zestril) 20 mg DAILY PO  Last administered on 3/21/17at 08:59; 

Admin Dose 20 MG;  Start 3/17/17 at 21:30


Clonidine (Catapres) 0.1 mg Q6H  PRN PO ELEVATED SYSTOLIC BP;  Start 3/17/17 at 

21:30


Senna (Senokot) 2 tab BID  PRN PO CONSTIPATION Last administered on 3/17/17at 21

:32; Admin Dose 2 TAB;  Start 3/17/17 at 21:30


Lactobacillus Acidoph/Bulgaricus (Floranex) 1 tab TID PO  Last administered on 3

/21/17at 12:34; Admin Dose 1 TAB;  Start 3/19/17 at 21:00


Acyclovir 400 mg 400 mg TID PO  Last administered on 3/21/17at 12:34; Admin 

Dose 400 MG;  Start 3/19/17 at 17:00;  Stop 3/26/17 at 16:59


Cefazolin Sodium (Ancef 1 Gm/50 ml (Pmx)) 50 ml @  100 mls/hr Q8 IVPB  Last 

administered on 3/21/17at 14:20; Admin Dose 100 MLS/HR;  Start 3/19/17 at 16:30

;  Stop 3/26/17 at 23:55


Insulin Glargine (Lantus) 10 unit DAILY@08 SC  Last administered on 3/21/17at 09

:06; Admin Dose 10 UNIT;  Start 3/21/17 at 08:00











REGINA RAZO Mar 21, 2017 16:44

## 2017-03-21 NOTE — RADRPT
Vent Rate: 70 bpm

RR Interval: 0 msec

IN Interval: 154 msec

QRS Duration: 82 msec

QT Interval: 400 msec

QTC Interval: 432 msec

P-R-T Axis: 54 - 5 - 60 degrees

 

 Normal sinus rhythm

 Normal ECG

 

Electronically Signed By: Nacho Doresy

[id number]

## 2017-03-22 VITALS — DIASTOLIC BLOOD PRESSURE: 61 MMHG | RESPIRATION RATE: 18 BRPM | SYSTOLIC BLOOD PRESSURE: 127 MMHG

## 2017-03-22 VITALS — HEART RATE: 62 BPM | RESPIRATION RATE: 18 BRPM | SYSTOLIC BLOOD PRESSURE: 148 MMHG | DIASTOLIC BLOOD PRESSURE: 75 MMHG

## 2017-03-22 LAB
ADD SCAN DIFF: NO
ANION GAP SERPL CALC-SCNC: 16 MMOL/L (ref 8–16)
BASOPHILS # BLD AUTO: 0.1 10^3/UL (ref 0–0.1)
BASOPHILS NFR BLD: 0.6 % (ref 0–2)
BUN SERPL-MCNC: 13 MG/DL (ref 7–20)
CALCIUM SERPL-MCNC: 9.7 MG/DL (ref 8.4–10.2)
CHLORIDE SERPL-SCNC: 100 MMOL/L (ref 97–110)
CO2 SERPL-SCNC: 27 MMOL/L (ref 21–31)
CREAT SERPL-MCNC: 0.44 MG/DL (ref 0.44–1)
EOSINOPHIL # BLD: 0.2 10^3/UL (ref 0–0.5)
EOSINOPHIL NFR BLD: 2.6 % (ref 0–7)
ERYTHROCYTE [DISTWIDTH] IN BLOOD BY AUTOMATED COUNT: 12.1 % (ref 11.5–14.5)
GLUCOSE SERPL-MCNC: 223 MG/DL (ref 70–220)
HCT VFR BLD CALC: 37.1 % (ref 37–47)
HGB BLD-MCNC: 12.7 G/DL (ref 12–16)
LYMPHOCYTES # BLD AUTO: 3.2 10^3/UL (ref 0.8–2.9)
LYMPHOCYTES NFR BLD AUTO: 39 % (ref 15–51)
MCH RBC QN AUTO: 29.5 PG (ref 29–33)
MCHC RBC AUTO-ENTMCNC: 34.2 G/DL (ref 32–37)
MCV RBC AUTO: 86.1 FL (ref 82–101)
MONOCYTES # BLD: 0.4 10^3/UL (ref 0.3–0.9)
MONOCYTES NFR BLD: 5.1 % (ref 0–11)
NEUTROPHILS # BLD: 4.3 10^3/UL (ref 1.6–7.5)
NEUTROPHILS NFR BLD AUTO: 52.5 % (ref 39–77)
NRBC # BLD MANUAL: 0 10^3/UL (ref 0–0)
NRBC BLD QL: 0 /100WBC (ref 0–0)
PLATELET # BLD: 299 10^3/UL (ref 140–415)
PMV BLD AUTO: 11.2 FL (ref 7.4–10.4)
POTASSIUM SERPL-SCNC: 4.2 MMOL/L (ref 3.5–5.1)
RBC # BLD AUTO: 4.31 10^6/UL (ref 4.2–5.4)
SODIUM SERPL-SCNC: 139 MMOL/L (ref 135–144)
WBC # BLD AUTO: 8.2 10^3/UL (ref 4.8–10.8)

## 2017-03-22 RX ADMIN — ACYCLOVIR SCH MG: 400 TABLET ORAL at 08:41

## 2017-03-22 RX ADMIN — LISINOPRIL SCH MG: 20 TABLET ORAL at 08:41

## 2017-03-22 RX ADMIN — DOCUSATE SODIUM SCH MG: 100 CAPSULE, LIQUID FILLED ORAL at 08:41

## 2017-03-22 RX ADMIN — Medication PRN MLS/HR: at 12:31

## 2017-03-22 RX ADMIN — CEFAZOLIN SCH MLS/HR: 1 INJECTION, POWDER, FOR SOLUTION INTRAMUSCULAR; INTRAVENOUS at 06:15

## 2017-03-22 RX ADMIN — ACYCLOVIR SCH MG: 400 TABLET ORAL at 12:31

## 2017-03-22 RX ADMIN — CEFAZOLIN SCH MLS/HR: 1 INJECTION, POWDER, FOR SOLUTION INTRAMUSCULAR; INTRAVENOUS at 13:51

## 2017-03-22 NOTE — PN
DATE:  

 

 

Status post incision and drainage of the left mastectomy site abscess.

 

SUBJECTIVE:  Feels better.  No nausea, no vomiting, tolerating diet.  No fever.

 

OBJECTIVE:

VITAL SIGNS:  Temperature 98, pulse is 69, respirations 18, blood pressure 127/61, saturation 97% on
 room air.

 

LABORATORY:  WBC 8200 with 52% segmented.  Chemistry:  Blood sugar POC is 250.  Wound clean.

 

ASSESSMENT:  A 62-year-old female is status post modified radical mastectomy with axillary dissectio
n 3 to 4 weeks ago followed later on developed abscess.  The abscess was drained.  Penrose was place
d.  Culture grew staph.  The patient is on antibiotic Ancef about 1 week.

 

PLAN:

1.  The patient can be discharged from surgical point of view.  

2.  To get antibiotic Keflex per infectious disease.

3.  Home health to visit the patient daily and change the dressing.

4.  Follow up by Dr. Lopez in the office next week.  The patient should call the office and make an 
appointment.

 

 

Dictated By: WESLY DOMÍNGUEZ MD

 

PS/LUIS ALBERTO

DD:    03/22/2017 12:51:42

DT:    03/22/2017 13:20:49

Conf#: [id number]

DID#:  [id number]

## 2017-03-22 NOTE — PN
DATE:  03/21/2017

 

 

SUBJECTIVE:  No complaint.  Feels much better.

 

OBJECTIVE:  98.7, 85, 16, 131/60 blood pressure, saturation 95% on room air.

 

LABORATORY DATA:  WBC 8900, ____72_% differential.  Chemistry: 

 

ASSESSMENT:  Patient with modified radical mastectomy, presented 3 weeks after 
operation with abscess of the incisional area.  It was drained by Dr. Lopez in 
the operating room.  The patient has been started on antibiotics.  The culture 
has grown staph and the antibiotic is Ancef 1 gram IV q.6 hours.  Dressing was 
changed today.  There is minimal drainage.  Wound looks good.

 

PLAN:  From a surgical point of view, the patient can be discharged with drains 
in place to be followed by dr. Lopez in the office.  If okay with infectious 
disease, probably the patient can be discharged tomorrow with oral antibiotic.

 

 

Dictated By: WESLY DOMÍNGUEZ MD

 

PS/NTS

DD:    03/21/2017 18:27:17

DT:    03/22/2017 00:56:45

Conf#: [id number]

DID#:  [id number]

 

MTDNORM

## 2017-03-26 NOTE — DS
DATE OF ADMISSION: 03/17/2017

DATE OF DISCHARGE: 03/22/2017

 

FINAL DIAGNOSES:

1.  Chest wall abscess status post incision and drainage of the left chest wall and wound abscess.

2.  Status post left modified radical mastectomy on 02/23/2017.

3.  Hypertension.

4.  Diabetes mellitus.

 

BRIEF HISTORY:  The patient is a 62-year-old  female who underwent left modified radical mas
tectomy on 02/23/2017.  The patient also had a history of diabetes, hypertension, and locally advanc
ed left breast cancer.  The patient was seen in postoperative appointment with Dr. Lopez and was not
ed to have a chest wall abscess and postoperative wound infection.  The patient was brought to the 
ospital and underwent I and D of the left chest wall and wound abscess.  The patient required antibi
otics and had significant pain and patient was admitted for further evaluation and management.

 

HOSPITAL COURSE:  The patient was given vancomycin and cefazolin.  The patient also was evaluated by
 Dr. Dreyer in infectious disease consultation.  The patient also continued on Lantus and NovoLog pe
r moderate algorithm sliding scale.  The patient had daily dressing changes and was followed by surg
giovana.  The patient's condition is improved.  The patient's wound culture grew oxacillin-sensitive Sta
phylococcus aureus.  The patient had intravenous antibiotics.  Overall, the patient's condition impr
miles.  The patient's insulin was titrated to keep the blood sugar under control.  The patient was di
scharged home with home health services for daily dressing changes.

 

CONDITION ON DISCHARGE:  Hemodynamically stable.

 

ACTIVITY:  As patient tolerates.

 

DIET:  1800 ADA, 2 g sodium diet.

 

DISCHARGE MEDICATIONS:

1.  The patient is given Keflex 500 mg p.o. q.8 hours for 7 more days.  

2.  The patient given prescription for Lantus 16 units subq daily at 8:00 p.m. 

3.  NovoLog 5 units subcu with meals t.i.d.  

4.  The patient to continue on Tylenol for pain.

5.  Aspirin.

6.  Norco for moderate to severe pain.

7.  Metformin.

8.  Accupril.

 

FOLLOWUP:  The patient is instructed to follow up with Dr. Lopez in 5 to 7 days and follow up with [facility] physician for glucose and hypertension management.  

 

Interdisciplinary plan of care was established for this patient.  Plan of care was discussed with Dr Sadia Arthur.

 

 

Dictated By: REGINA RAZO NP for BRYAN ARTHUR MD

 

SR/NTS

DD:    03/26/2017 20:11:57

DT:    03/26/2017 22:00:48

Conf#: [id number]

DID#:  [id number]

CC: OLIVIA LOPEZ MD;*End*

## 2018-03-25 ENCOUNTER — HOSPITAL ENCOUNTER (EMERGENCY)
Age: 63
Discharge: HOME | End: 2018-03-25

## 2018-03-25 ENCOUNTER — HOSPITAL ENCOUNTER (EMERGENCY)
Dept: HOSPITAL 91 - FTE | Age: 63
Discharge: HOME | End: 2018-03-25
Payer: COMMERCIAL

## 2018-03-25 DIAGNOSIS — Z79.84: ICD-10-CM

## 2018-03-25 DIAGNOSIS — Z79.82: ICD-10-CM

## 2018-03-25 DIAGNOSIS — H92.01: Primary | ICD-10-CM

## 2018-03-25 DIAGNOSIS — E11.9: ICD-10-CM

## 2018-03-25 PROCEDURE — 99283 EMERGENCY DEPT VISIT LOW MDM: CPT

## 2019-03-01 ENCOUNTER — HOSPITAL ENCOUNTER (INPATIENT)
Dept: HOSPITAL 91 - MS1 | Age: 64
LOS: 4 days | Discharge: HOME | DRG: 871 | End: 2019-03-05
Payer: COMMERCIAL

## 2019-03-01 ENCOUNTER — HOSPITAL ENCOUNTER (INPATIENT)
Dept: HOSPITAL 10 - E/R | Age: 64
LOS: 4 days | Discharge: HOME | DRG: 871 | End: 2019-03-05
Attending: INTERNAL MEDICINE | Admitting: INTERNAL MEDICINE
Payer: COMMERCIAL

## 2019-03-01 VITALS
WEIGHT: 148.37 LBS | BODY MASS INDEX: 27.3 KG/M2 | HEIGHT: 62 IN | HEIGHT: 62 IN | WEIGHT: 148.37 LBS | BODY MASS INDEX: 27.3 KG/M2

## 2019-03-01 VITALS — HEART RATE: 100 BPM | RESPIRATION RATE: 18 BRPM | DIASTOLIC BLOOD PRESSURE: 52 MMHG | SYSTOLIC BLOOD PRESSURE: 99 MMHG

## 2019-03-01 VITALS — SYSTOLIC BLOOD PRESSURE: 92 MMHG | DIASTOLIC BLOOD PRESSURE: 55 MMHG | RESPIRATION RATE: 18 BRPM | HEART RATE: 108 BPM

## 2019-03-01 VITALS — HEART RATE: 108 BPM

## 2019-03-01 DIAGNOSIS — B96.1: ICD-10-CM

## 2019-03-01 DIAGNOSIS — E78.5: ICD-10-CM

## 2019-03-01 DIAGNOSIS — Z79.4: ICD-10-CM

## 2019-03-01 DIAGNOSIS — R65.20: ICD-10-CM

## 2019-03-01 DIAGNOSIS — E87.1: ICD-10-CM

## 2019-03-01 DIAGNOSIS — E11.65: ICD-10-CM

## 2019-03-01 DIAGNOSIS — Z85.3: ICD-10-CM

## 2019-03-01 DIAGNOSIS — J18.1: ICD-10-CM

## 2019-03-01 DIAGNOSIS — A41.50: Primary | ICD-10-CM

## 2019-03-01 DIAGNOSIS — Z79.82: ICD-10-CM

## 2019-03-01 DIAGNOSIS — N10: ICD-10-CM

## 2019-03-01 DIAGNOSIS — E86.0: ICD-10-CM

## 2019-03-01 DIAGNOSIS — I10: ICD-10-CM

## 2019-03-01 DIAGNOSIS — E87.2: ICD-10-CM

## 2019-03-01 LAB
ADD MAN DIFF?: NO
ADD UMIC: YES
ALANINE AMINOTRANSFERASE: 37 IU/L (ref 13–69)
ALBUMIN/GLOBULIN RATIO: 1.1
ALBUMIN: 4.3 G/DL (ref 3.3–4.9)
ALKALINE PHOSPHATASE: 197 IU/L (ref 42–121)
ANION GAP: 17 (ref 5–13)
ASPARTATE AMINO TRANSFERASE: 41 IU/L (ref 15–46)
BASOPHIL #: 0.1 10^3/UL (ref 0–0.1)
BASOPHILS %: 0.4 % (ref 0–2)
BILIRUBIN,DIRECT: 0 MG/DL (ref 0–0.2)
BILIRUBIN,TOTAL: 0.4 MG/DL (ref 0.2–1.3)
BLOOD UREA NITROGEN: 26 MG/DL (ref 7–20)
CALCIUM: 10.1 MG/DL (ref 8.4–10.2)
CARBON DIOXIDE: 21 MMOL/L (ref 21–31)
CHLORIDE: 91 MMOL/L (ref 97–110)
CREATININE: 0.76 MG/DL (ref 0.44–1)
EOSINOPHILS #: 0 10^3/UL (ref 0–0.5)
EOSINOPHILS %: 0 % (ref 0–7)
FIO2: 21 %
GLOBULIN: 3.9 G/DL (ref 1.3–3.2)
GLUCOSE: 484 MG/DL (ref 70–220)
GLUCOSE: 531 MG/DL (ref 70–220)
HEMATOCRIT: 39 % (ref 37–47)
HEMOGLOBIN: 13.4 G/DL (ref 12–16)
IMMATURE GRANS #M: 0.08 10^3/UL (ref 0–0.03)
IMMATURE GRANS % (M): 0.6 % (ref 0–0.43)
INR: 1.4
LACTIC ACID: 1.8 MMOL/L (ref 0.5–2)
LYMPHOCYTES #: 0.7 10^3/UL (ref 0.8–2.9)
LYMPHOCYTES %: 4.9 % (ref 15–51)
MAGNESIUM: 1.9 MG/DL (ref 1.7–2.5)
MEAN CORPUSCULAR HEMOGLOBIN: 28.9 PG (ref 29–33)
MEAN CORPUSCULAR HGB CONC: 34.4 G/DL (ref 32–37)
MEAN CORPUSCULAR VOLUME: 84.2 FL (ref 82–101)
MEAN PLATELET VOLUME: 10.7 FL (ref 7.4–10.4)
METHGB VENOUS: 0.3 %
MODE: (no result)
MONOCYTE #: 0.5 10^3/UL (ref 0.3–0.9)
MONOCYTES %: 3.3 % (ref 0–11)
NEUTROPHIL #: 12.8 10^3/UL (ref 1.6–7.5)
NEUTROPHILS %: 90.8 % (ref 39–77)
NUCLEATED RED BLOOD CELLS #: 0 10^3/UL (ref 0–0)
NUCLEATED RED BLOOD CELLS%: 0 /100WBC (ref 0–0)
PARTIAL THROMBOPLASTIN TIME: 42.5 SEC (ref 23–35)
PHOSPHORUS: 2.1 MG/DL (ref 2.5–4.9)
PLATELET COUNT: 177 10^3/UL (ref 140–415)
POTASSIUM: 5 MMOL/L (ref 3.5–5.1)
PROTIME: 17.3 SEC (ref 11.9–14.9)
PT RATIO: 1.4
RED BLOOD COUNT: 4.63 10^6/UL (ref 4.2–5.4)
RED CELL DISTRIBUTION WIDTH: 12.2 % (ref 11.5–14.5)
SAMPLE TYPE: (no result)
SODIUM: 129 MMOL/L (ref 135–144)
TOTAL PROTEIN: 8.2 G/DL (ref 6.1–8.1)
TROPONIN-I: < 0.012 NG/ML (ref 0–0.12)
UR ASCORBIC ACID: NEGATIVE MG/DL
UR BACTERIA: (no result) /HPF
UR BILIRUBIN (DIP): NEGATIVE MG/DL
UR BLOOD (DIP): (no result) MG/DL
UR CLARITY: CLEAR
UR COLOR: YELLOW
UR GLUCOSE (DIP): (no result) MG/DL
UR KETONES (DIP): (no result) MG/DL
UR LEUKOCYTE ESTERASE (DIP): (no result) LEU/UL
UR NITRITE (DIP): NEGATIVE MG/DL
UR PH (DIP): 6 (ref 5–9)
UR RBC: 1 /HPF (ref 0–5)
UR SPECIFIC GRAVITY (DIP): 1.02 (ref 1–1.03)
UR TOTAL PROTEIN (DIP): (no result) MG/DL
UR UROBILINOGEN (DIP): NEGATIVE MG/DL
UR WBC: 23 /HPF (ref 0–5)
VENOUS COHB: 0.3 %
VENOUS FRACTION OXYHGB: 40.6 %
VENOUS OXYGEN SAT: 40.8 MMHG (ref 55–75)
VENOUS TOTAL HEMGLOBIN: 13.2 G/DL
WHITE BLOOD COUNT: 14.1 10^3/UL (ref 4.8–10.8)

## 2019-03-01 PROCEDURE — 82962 GLUCOSE BLOOD TEST: CPT

## 2019-03-01 PROCEDURE — 4A033R1 MEASUREMENT OF ARTERIAL SATURATION, PERIPHERAL, PERCUTANEOUS APPROACH: ICD-10-PCS

## 2019-03-01 PROCEDURE — 84100 ASSAY OF PHOSPHORUS: CPT

## 2019-03-01 PROCEDURE — 85730 THROMBOPLASTIN TIME PARTIAL: CPT

## 2019-03-01 PROCEDURE — 87400 INFLUENZA A/B EACH AG IA: CPT

## 2019-03-01 PROCEDURE — 87040 BLOOD CULTURE FOR BACTERIA: CPT

## 2019-03-01 PROCEDURE — 82947 ASSAY GLUCOSE BLOOD QUANT: CPT

## 2019-03-01 PROCEDURE — 93005 ELECTROCARDIOGRAM TRACING: CPT

## 2019-03-01 PROCEDURE — 82803 BLOOD GASES ANY COMBINATION: CPT

## 2019-03-01 PROCEDURE — 71045 X-RAY EXAM CHEST 1 VIEW: CPT

## 2019-03-01 PROCEDURE — 83605 ASSAY OF LACTIC ACID: CPT

## 2019-03-01 PROCEDURE — 96366 THER/PROPH/DIAG IV INF ADDON: CPT

## 2019-03-01 PROCEDURE — 85610 PROTHROMBIN TIME: CPT

## 2019-03-01 PROCEDURE — 84484 ASSAY OF TROPONIN QUANT: CPT

## 2019-03-01 PROCEDURE — 80053 COMPREHEN METABOLIC PANEL: CPT

## 2019-03-01 PROCEDURE — 80048 BASIC METABOLIC PNL TOTAL CA: CPT

## 2019-03-01 PROCEDURE — 81001 URINALYSIS AUTO W/SCOPE: CPT

## 2019-03-01 PROCEDURE — 83735 ASSAY OF MAGNESIUM: CPT

## 2019-03-01 PROCEDURE — 96375 TX/PRO/DX INJ NEW DRUG ADDON: CPT

## 2019-03-01 PROCEDURE — 85025 COMPLETE CBC W/AUTO DIFF WBC: CPT

## 2019-03-01 PROCEDURE — 83036 HEMOGLOBIN GLYCOSYLATED A1C: CPT

## 2019-03-01 PROCEDURE — 36415 COLL VENOUS BLD VENIPUNCTURE: CPT

## 2019-03-01 PROCEDURE — 87086 URINE CULTURE/COLONY COUNT: CPT

## 2019-03-01 PROCEDURE — 96365 THER/PROPH/DIAG IV INF INIT: CPT

## 2019-03-01 PROCEDURE — 99285 EMERGENCY DEPT VISIT HI MDM: CPT

## 2019-03-01 RX ADMIN — VANCOMYCIN HYDROCHLORIDE 1 MLS/HR: 1 INJECTION, POWDER, LYOPHILIZED, FOR SOLUTION INTRAVENOUS at 17:01

## 2019-03-01 RX ADMIN — ACETAMINOPHEN 1 MG: 325 TABLET, FILM COATED ORAL at 17:44

## 2019-03-01 RX ADMIN — THIAMINE HYDROCHLORIDE 1 MLS/HR: 100 INJECTION, SOLUTION INTRAMUSCULAR; INTRAVENOUS at 18:15

## 2019-03-01 RX ADMIN — CEFEPIME SCH MLS/HR: 1 INJECTION, POWDER, FOR SOLUTION INTRAMUSCULAR; INTRAVENOUS at 23:53

## 2019-03-01 RX ADMIN — THIAMINE HYDROCHLORIDE 1 ML: 100 INJECTION, SOLUTION INTRAMUSCULAR; INTRAVENOUS at 16:01

## 2019-03-01 RX ADMIN — CEFEPIME 1 MLS/HR: 1 INJECTION, POWDER, FOR SOLUTION INTRAMUSCULAR; INTRAVENOUS at 23:53

## 2019-03-01 RX ADMIN — CEFEPIME 1 MLS/HR: 1 INJECTION, POWDER, FOR SOLUTION INTRAMUSCULAR; INTRAVENOUS at 16:36

## 2019-03-01 RX ADMIN — KETOROLAC TROMETHAMINE 1 MG: 15 INJECTION, SOLUTION INTRAMUSCULAR; INTRAVENOUS at 17:44

## 2019-03-01 NOTE — ERD
ER Documentation


Chief Complaint


Chief Complaint





pt is bib family with c/o fever, aches/pains x few wks





HPI


This is a 64-year-old female with a past medical history of hypertension, 


hyperlipidemia, insulin-dependent diabetes who is presenting with a few weeks of


intermittent fevers, chills, full body aches and pains, dysuria with urinary 


frequency and urgency.  The patient seemed to have come and go over the course 


of a few weeks, but they became more pronounced several days ago.  She endorses 


suprapubic tenderness.  She endorses nausea with a few episodes of nonbilious 


nonbloody vomiting.  She has no flank pain.  She also endorses a congested 


cough, productive of clear/yellow sputum.  She denies chest pain or chest 


tightness or shortness of breath.  She does endorse occasional palpitations.  


She denies any constipation or diarrhea.  She denies any black or bloody or 


tarry stools.





The patient has had no headache or vision changes.  The patient does not endorse


neck or back pain. The patient denies lightheadedness or dizziness.  The patient


has had no focal deficits.  The patient has had no weakness or numbness or t


ingling to the face or extremities.





ROS


All systems reviewed and are negative except as per history of present illness.





Medications


Home Meds


Active Scripts


Ibuprofen* (Motrin*) 800 Mg Tab, 800 MG PO Q6H PRN for PAIN AND OR ELEVATED 


TEMP, #30 TAB


   Prov:LYSSA ROMERO MD         3/25/18


Hydrocodone/Acetaminophen (Norco 5-325 Tablet) 1 Each Tablet, 1 TAB PO Q6H PRN 


for PAIN, #20 TAB


   Prov:REGINA RAZO         3/22/17


Reported Medications


Cholecalciferol (Vitamin D3) (VITAMIN D-3) 2,000 Unit Capsule, 2000 UNIT PO 


DAILY


   3/1/19


Atorvastatin Calcium (Atorvastatin Calcium) 10 Mg Tablet, 10 MG PO QHS, #30 TAB


   3/1/19


Amlodipine Besylate* (Amlodipine Besylate*) 5 Mg Tablet, 5 MG PO DAILY


   3/1/19


Letrozole* (Letrozole*) 2.5 Mg Tablet, 2.5 MG PO DAILY


   3/1/19


Aspirin* (Aspirin* EC) 81 Mg Tablet.dr, 81 MG PO DAILY, TAB


   3/1/19


Insulin Isophan/Regular (Humulin 70/30) 100 Units/Ml Susp, 25 UNIT SC QPM, EA


   3/1/19


Insulin Isophan/Regular (Humulin 70/30) 100 Units/Ml Susp, 35 UNIT SC QAM, EA


   3/1/19


Lisinopril* (Lisinopril*) 20 Mg Tablet, 20 MG PO DAILY


   3/1/19


Magnesium Oxide* (Mag-Oxide*) 400 Mg Tablet, 400 MG PO BID


   3/1/19


Quinapril Hcl (Accupril) 10 Mg Tablet, 20 MG PO DAILY


   3/17/14


Metformin* (Glucophage*) 1,000 Mg Tablet, 1000 MG PO BID


   3/17/14


Discontinued Reported Medications


Aspirin Delayed Release (Aspirin Delayed Release) 81 Mg Tablet.dr, 81 MG PO 


DAILY


   3/17/14


Discontinued Scripts


Insulin Aspart* (Novolog Insulin Pen*) 100 Unit/Ml Soln, 5 UNIT SC WITH MEALS 


for 30 Days


   Prov:REGINA RAZO         3/22/17


Insulin Glargine* (Lantus*) 100 Unit/Ml Soln, 16 UNIT SC DAILY@08 for 30 Days


   Prov:REGINA RAZO         3/22/17


Cephalexin* (Cephalexin* Susp) 250 Mg/5 Ml Susp.recon, 500 MG PO Q8 for 7 Days, 


#1 BOTTLE


   Prov:REGINA RAZO         3/22/17


Acetaminophen* (Tylophen*) 500 Mg Capsule, 1 CAP PO Q6H PRN for PAIN AND OR 


ELEVATED TEMP, #20 CAP


   Prov:KIZZY TILLMAN         8/8/15





Allergies


Allergies:  


Coded Allergies:  


     No Known Allergies (Unverified  Allergy, Unknown, 3/16/17)





PMhx/Soc


History of Surgery:  Yes (LEFT BREAST INCISION PRIOR TO THIS SURGERY)


Anesthesia Reaction:  No


Hx Neurological Disorder:  No


Hx Respiratory Disorders:  No


Hx Cardiac Disorders:  Yes (Hypertension, hyperlipidemia, insulin-dependent 


diabetes)


Hx Psychiatric Problems:  No


Hx Miscellaneous Medical Probl:  No (BREAST CA)


Hx Alcohol Use:  No


Hx Substance Use:  No


Hx Tobacco Use:  No


Smoking Status:  Never smoker





FmHx


Family History:  diabetes





Physical Exam


Vitals





Vital Signs


  Date      Temp   Pulse  Resp  B/P (MAP)   Pulse Ox  O2         O2 Flow    FiO2


Time                                                  Delivery   Rate


    3/1/19  102.3    129    24      119/60        96  Room Air


     17:46                            (79)


    3/1/19  102.3


     17:44


    3/1/19  100.3    127    24      134/64        95  Room Air


     16:07                            (87)


    3/1/19  101.2    131    20      141/67        96


     15:26                            (91)





Physical Exam


Const:   No apparent distress, well-developed, well-nourished


Head:   Normocephalic, Atraumatic 


Eyes:   Normal Conjunctiva.  Extraocular movements intact. Pupils equal, round 


and reactive to light


ENT:   Normal External Ears, Nose and Mouth.


Neck:   Full range of motion. No meningismus.


Resp:   Faint bibasilar coarse breath sounds right greater than left.  No 


wheezes.  No respiratory distress.


Cardio:   Regular rhythm.  Tachycardia.  No murmurs, rubs or gallops


Abd:   Soft, non distended.  Suprapubic tenderness.  Normal bowel sounds


Skin:   No petechiae or rashes


Back:   No midline tenderness. No CVA tenderness


Ext:   No cyanosis, or edema


Neur:   Awake and alert, oriented 4.  Cranial nerves intact.  No facial droop. 


Normal strength, sensation and coordination.


Psych:   Normal Mood and Affect


Result Diagram:  


3/1/19 1555                                                                     


          3/1/19 1555





Results 24 hrs





Laboratory Tests


Test
               3/1/19
15:55  3/1/19
15:57     3/1/19
16:27     3/1/19
17:35


White Blood        14.1 10^3/ul


Count


Red Blood Count    4.63 10^6/ul


Hemoglobin            13.4 g/dl


Hematocrit               39.0 %


Mean Corpuscular        84.2 fl


Volume


Mean Corpuscular        28.9 pg


Hemoglobin


Mean Corpuscular     34.4 g/dl 
  
             
                



Hemoglobin
Faby


nt


Red Cell                 12.2 %


Distribution


Width


Platelet Count      177 10^3/UL


Mean Platelet           10.7 fl


Volume


Immature                0.600 %


Granulocytes %


Neutrophils %            90.8 %


Lymphocytes %             4.9 %


Monocytes %               3.3 %


Eosinophils %             0.0 %


Basophils %               0.4 %


Nucleated Red       0.0 /100WBC


Blood Cells %


Immature          0.080 10^3/ul


Granulocytes #


Neutrophils #      12.8 10^3/ul


Lymphocytes #       0.7 10^3/ul


Monocytes #         0.5 10^3/ul


Eosinophils #       0.0 10^3/ul


Basophils #         0.1 10^3/ul


Nucleated Red       0.0 10^3/ul


Blood Cells #


Prothrombin Time       17.3 Sec


Prothrombin Time            1.4


Ratio


INR                       1.40 
  
             
                



International


Normalized
Ratio


Activated             42.5 Sec 
  
             
                



Partial
Thrombop


last Time


Sodium Level         129 mmol/L


Potassium Level      5.0 mmol/L


Chloride Level        91 mmol/L


Carbon Dioxide        21 mmol/L


Level


Anion Gap                    17


Blood Urea             26 mg/dl


Nitrogen


Creatinine           0.76 mg/dl


Est Glomerular    > 60 mL/min 
   
             
                



Filtrat


Rate
mL/min


Glucose Level         531 mg/dl


Calcium Level        10.1 mg/dl


Phosphorus Level      2.1 mg/dl


Magnesium Level       1.9 mg/dl


Total Bilirubin       0.4 mg/dl


Direct Bilirubin     0.00 mg/dl


Indirect              0.4 mg/dl


Bilirubin


Aspartate Amino        41 IU/L 
  
             
                



Transf
(AST/SGOT


)


Alanine                37 IU/L 
  
             
                



Aminotransferase



(ALT/SGPT)


Alkaline               197 IU/L


Phosphatase


Troponin I        < 0.012 ng/ml


Total Protein          8.2 g/dl


Albumin                4.3 g/dl


Globulin              3.90 g/dl


Albumin/Globulin           1.10


Ratio


POC Venous                         3.5 mmol/L


Lactate


Blood Gas                                       Blood venous


Specimen Source


Arterial Blood    
               
             3/1/2019
5:17:5  



Date Drawn
                                                5 PM


Arterial Blood    
               
             VENOUS LINE 
    



Gas


Puncture
Site


Diogo Test                                      N/A


Venous Blood pH                                          7.380


Venous Blood      
               
                 36.2 mmHG 
  



pCO2


(Temp
Corrected)


Venous Blood pO2  
               
                 22.7 mmHG 
  



(Temp
Corrected)


Venous Blood                                       20.9 mmol/L


HCO3


Venous Blood                                         40.8 mmHG


Oxygen


Saturation


Venous Blood                                       -3.6 mmol/L


Base Excess


Venous Blood                                         13.2 g/dl


Total Hemoglobin


Venous Blood                                            40.6 %


Oxyhemoglobin


Venous Blood                                             0.3 %


Methemoglobin


Carboxyhemoglobi                                         0.3 %


n


Blood Gas                                               37.0 C


Temperature


Blood Gas                                       ROOM AIR


Modality


FiO2                                                    21.0 %


Blood Gas                                       M.D.


Notified Whom


Blood Gas         
               
             3/1/2019
5:22:3  



Notified Time
                                             3 PM


Urine Color                                                      YELLOW


Urine Clarity                                                    CLEAR


Urine pH                                                                    6.0


Urine Specific                                                            1.023


Gravity


Urine Ketones                                                          1+ mg/dL


Urine Nitrite                                                    NEGATIVE mg/dL


Urine Bilirubin                                                  NEGATIVE mg/dL


Urine                                                            NEGATIVE mg/dL


Urobilinogen


Urine Leukocyte                                                  TRACE Hamzah/ul


Esterase


Urine                                                                    1 /HPF


Microscopic RBC


Urine                                                                   23 /HPF


Microscopic WBC


Urine Bacteria                                                   FEW /HPF


Urine Hemoglobin                                                       1+ mg/dL


Urine Glucose                                                          3+ mg/dL


Urine Total                                                            1+ mg/dl


Protein


Test
               3/1/19
18:07  
             
                



POC Venous           1.9 mmol/L


Lactate





Current Medications


 Medications
   Dose
          Sig/Venu
       Start Time
   Status  Last


 (Trade)       Ordered        Route
 PRN     Stop Time              Admin
Dose


                              Reason                                Admin


 Sodium         1,620 ml       BOLUS OVER 2   3/1/19        DC            3/1/19


Chloride
                     HOURS STAT
    15:48
 3/1/19                16:01



(NS)                          IV*
           15:49


 Vancomycin     250 ml @ 
     ONCE  ONCE
    3/1/19        DC            3/1/19


HCl            125 mls/hr     IVPB
          16:30
 3/1/19                17:01



                                             18:29


 Cefepime HCl   50 ml @ 
      ONCE  ONCE
    3/1/19        DC            3/1/19


               100 mls/hr     IVPB
          16:30
 3/1/19                16:36



                                             16:59


                650 mg         ONCE  ONCE
    3/1/19        DC            3/1/19


Acetaminophen                 PO
            17:30
 3/1/19                17:44




  (Tylenol                                  17:31


Tab)


 Ketorolac
     15 mg          ONCE  STAT
    3/1/19        DC            3/1/19


Tromethamine
                 IV
            17:22
 3/1/19                17:44



 (Toradol)                                   17:29


 Sodium         1,000 ml @ 
   Q1H ONCE
      3/1/19                      3/1/19


Chloride       1,000 mls/hr   IV
            18:30
 3/1/19                18:15



                                             19:29








Procedures/MDM


MDM





The patient's presentation warrants further investigation. Previous medical 


records, if available, were reviewed.





LABS





The patient's laboratory testing was obtained and reviewed. No emergent 


treatment was required unless described below.





CBC:   Leukocytosis with left shift, concerning for an infectious etiology of 


symptoms.  No E/o anemia or thrombocytopenia


Chemistry:   No E/o severe acidosis or alkalosis or renal failure or liver 


disease or diabetic ketoacidosis.  Mild hyponatremia, nonemergent, will be 


repleted with IV fluids.  Hyponatremia could also be related to pneumonia.  


Elevated BUN with a BUN: Creatinine ratio greater than 20: 1, concerning for 


dehydration.  Mildly elevated anion gap without evidence of acidosis.


PT/INR:   No E/o significant coagulopathy


Lactate:   E/o severe sepsis


Troponin:   No E/o acute ischemia


Urine:   E/o acute infection with hematuria


Influenza:   Negative





EKG





EKG read by me: 


Rate/Rhythm:    Sinus tachycardia at 128 bpm


Intervals:    Normal


Axis:    Normal


Impression:    No evidence of acute ischemia.  Sinus tachycardia.





IMAGING





Imaging and Radiology interpretation reviewed.





CXR


FINDINGS: The heart and mediastinum are within normal limits.  There is a mild 


right lower lobe infiltrate.  There is no pleural effusion or pneumothorax.    


IMPRESSION: Mild right lower lobe infiltrate.


Electronically viewed and signed by .Pepe Ca MD, MD on 03/01/2019 


16:28 





TREATMENT/DISPOSITION





The patient presents with symptoms concerning for sepsis.  She is febrile and 


tachycardic, meeting criteria for a systemic inflammatory response syndrome.  


She is found to have leukocytosis and lactic acidosis, concerning for severe 


sepsis.  The patient has evidence of urinary tract infection in addition to a 


right lower lobe pneumonia.  The patient was treated with vancomycin and 


cefepime in the emergency department.  The patient was given a sepsis bolus.  


Blood cultures were sent off.  Please see sepsis note below.





The patient's chest xray does not reveal pneumothorax or pleural effusions or 


pulmonary edema. The patient does not have a widened mediastinum and does not 


have signs or symptoms concerning for thoracic aortic aneurysm or dissection. 


The patient does not have pneumomediastinum or signs concerning for esophageal 


tear or rupture. The patient has no clinical or radiographic signs of 


pericardial effusion or tamponade.  The patient does not have pneumoperitoneum 


and I have decreased suspicion of viscus perforation as possible referred pain. 


The patient does not have a history of heart failure and I have low suspicion 


for this. The patient does not have a diagnosis of COPD and is not wheezing 


today. The patient is not tachypneic or hypoxic. The patient is breathing 


comfortably and without pleuritic pain. The patient is not on hormonal therapy. 


The patient has no history of clotting or bleeding disorders. The patient has no


calf tenderness. The patient has had no hemoptysis.  I have decreased suspicion 


for PE. The patient's troponin and EKG are reassuring. I have low suspicion for 


acute coronary syndrome. 





The patient does have a history of diabetes.  She does have significant 


hyperglycemia, but there is no evidence of DKA.  She will benefit from IV fluid 


resuscitation in the emergency department.





SEPSIS NOTE





SIRS Criteria:      Tachycardia, fever, leukocytosis   





Infectious source:    UTI, pneumonia





End organ damage indicated by: Lactate > 2.0 mmol/L





SEPSIS MANAGEMENT





Time to recognize sepsis:    1600.


Time to recognize severe sepsis:   1600.


Time to recognize septic shock:    [No septic shock at this time].





3 HOUR BUNDLE





Blood cultures x 2 before abx:    [Yes]


30 ml/kg NS bolus    [Completed]


Initial lactate       3.5


Repeat lactate        19





SEPTIC SHOCK ASSESSMENT:





[NO] lactic acid > 4.0 


[NO] persistent hypotension (SBP < 90 or 40 mmHg drop, MAP < 65) despite 30 L/kg


 IV fluid bolus





CRITICAL CARE





Critical care time [35] minutes





Emergent fluid management while maintaining close respiratory support.  


Provision of immediate and broad-spectrum antibiotic therapy.  Simultaneous 


assessment for possible sources in order to direct targeted therapy.  Cons


ideration for invasive and chemical support to prevent cardiopulmonary collapse.


  Critical care time is independent of procedures performed.





ADMISSION





The patient will be admitted to Dr. Patel in accordance with the patient's 


insurance.  The patient was accepted by to telemetry at 1840 5 PM on March 1, 2019.





Disclaimer: Inadvertent spelling and grammatical errors are likely due to 


EHR/dictation software use and do not reflect on the overall quality of patient 


care. Note that the electronic time recorded on this note does not necessarily 


reflect the actual time of the patient encounter.





Departure


Diagnosis:  


   Primary Impression:  


   Severe sepsis


   Additional Impressions:  


   UTI (urinary tract infection)


   Urinary tract infection type:  acute cystitis  Hematuria presence:  with 


   hematuria  Qualified Codes:  N30.01 - Acute cystitis with hematuria


   Pneumonia


   Pneumonia type:  due to unspecified organism  Laterality:  right  Lung 


   location:  lower lobe of lung  Qualified Codes:  J18.1 - Lobar pneumonia, 


   unspecified organism


   Tachycardia


   Fever


   Fever type:  unspecified  Qualified Codes:  R50.9 - Fever, unspecified


   Leukocytosis


   Leukocytosis type:  unspecified  Qualified Codes:  D72.829 - Elevated white 


   blood cell count, unspecified


   Lactic acidosis


   Hyponatremia


   Elevated BUN


   Dehydration


   Elevated alkaline phosphatase level


   Hyperglycemia


Condition:  Serious











KALI POTTS MD               Mar 1, 2019 19:00

## 2019-03-02 VITALS — SYSTOLIC BLOOD PRESSURE: 103 MMHG | RESPIRATION RATE: 20 BRPM | HEART RATE: 104 BPM | DIASTOLIC BLOOD PRESSURE: 71 MMHG

## 2019-03-02 VITALS — DIASTOLIC BLOOD PRESSURE: 53 MMHG | SYSTOLIC BLOOD PRESSURE: 95 MMHG

## 2019-03-02 VITALS — SYSTOLIC BLOOD PRESSURE: 118 MMHG | HEART RATE: 105 BPM | RESPIRATION RATE: 17 BRPM | DIASTOLIC BLOOD PRESSURE: 56 MMHG

## 2019-03-02 VITALS — DIASTOLIC BLOOD PRESSURE: 60 MMHG | SYSTOLIC BLOOD PRESSURE: 123 MMHG | HEART RATE: 120 BPM | RESPIRATION RATE: 18 BRPM

## 2019-03-02 VITALS — DIASTOLIC BLOOD PRESSURE: 55 MMHG | SYSTOLIC BLOOD PRESSURE: 105 MMHG | RESPIRATION RATE: 18 BRPM | HEART RATE: 105 BPM

## 2019-03-02 VITALS — HEART RATE: 103 BPM

## 2019-03-02 VITALS — SYSTOLIC BLOOD PRESSURE: 105 MMHG | DIASTOLIC BLOOD PRESSURE: 52 MMHG | HEART RATE: 107 BPM | RESPIRATION RATE: 18 BRPM

## 2019-03-02 VITALS — HEART RATE: 120 BPM

## 2019-03-02 VITALS — HEART RATE: 105 BPM

## 2019-03-02 VITALS — HEART RATE: 104 BPM

## 2019-03-02 VITALS — HEART RATE: 98 BPM

## 2019-03-02 VITALS — HEART RATE: 112 BPM

## 2019-03-02 LAB
ADD MAN DIFF?: NO
ANION GAP: 9 (ref 5–13)
BASOPHIL #: 0.1 10^3/UL (ref 0–0.1)
BASOPHILS %: 0.4 % (ref 0–2)
BLOOD UREA NITROGEN: 20 MG/DL (ref 7–20)
CALCIUM: 8.9 MG/DL (ref 8.4–10.2)
CARBON DIOXIDE: 20 MMOL/L (ref 21–31)
CHLORIDE: 106 MMOL/L (ref 97–110)
CREATININE: 0.46 MG/DL (ref 0.44–1)
EOSINOPHILS #: 0 10^3/UL (ref 0–0.5)
EOSINOPHILS %: 0.1 % (ref 0–7)
GLUCOSE: 349 MG/DL (ref 70–220)
HEMATOCRIT: 34.6 % (ref 37–47)
HEMOGLOBIN A1C: 11.5 % (ref 0–5.9)
HEMOGLOBIN: 11.5 G/DL (ref 12–16)
IMMATURE GRANS #M: 0.08 10^3/UL (ref 0–0.03)
IMMATURE GRANS % (M): 0.7 % (ref 0–0.43)
LYMPHOCYTES #: 0.9 10^3/UL (ref 0.8–2.9)
LYMPHOCYTES %: 7.2 % (ref 15–51)
MEAN CORPUSCULAR HEMOGLOBIN: 28.9 PG (ref 29–33)
MEAN CORPUSCULAR HGB CONC: 33.2 G/DL (ref 32–37)
MEAN CORPUSCULAR VOLUME: 86.9 FL (ref 82–101)
MEAN PLATELET VOLUME: 11.5 FL (ref 7.4–10.4)
MONOCYTE #: 1 10^3/UL (ref 0.3–0.9)
MONOCYTES %: 8 % (ref 0–11)
NEUTROPHIL #: 10 10^3/UL (ref 1.6–7.5)
NEUTROPHILS %: 83.6 % (ref 39–77)
NUCLEATED RED BLOOD CELLS #: 0 10^3/UL (ref 0–0)
NUCLEATED RED BLOOD CELLS%: 0 /100WBC (ref 0–0)
PLATELET COUNT: 147 10^3/UL (ref 140–415)
POTASSIUM: 4.1 MMOL/L (ref 3.5–5.1)
RED BLOOD COUNT: 3.98 10^6/UL (ref 4.2–5.4)
RED CELL DISTRIBUTION WIDTH: 12.7 % (ref 11.5–14.5)
SODIUM: 135 MMOL/L (ref 135–144)
WHITE BLOOD COUNT: 12 10^3/UL (ref 4.8–10.8)

## 2019-03-02 RX ADMIN — INSULIN ASPART 1 UNIT: 100 INJECTION, SOLUTION INTRAVENOUS; SUBCUTANEOUS at 20:51

## 2019-03-02 RX ADMIN — INSULIN ASPART 1 UNIT: 100 INJECTION, SOLUTION INTRAVENOUS; SUBCUTANEOUS at 18:07

## 2019-03-02 RX ADMIN — MAGNESIUM OXIDE TAB 400 MG (241.3 MG ELEMENTAL MG) 1 MG: 400 (241.3 MG) TAB at 08:43

## 2019-03-02 RX ADMIN — INSULIN ASPART 1 UNIT: 100 INJECTION, SOLUTION INTRAVENOUS; SUBCUTANEOUS at 00:01

## 2019-03-02 RX ADMIN — ASPIRIN 1 MG: 81 TABLET, COATED ORAL at 08:43

## 2019-03-02 RX ADMIN — INSULIN ASPART 1 UNIT: 100 INJECTION, SOLUTION INTRAVENOUS; SUBCUTANEOUS at 14:08

## 2019-03-02 RX ADMIN — BISACODYL 1 MG: 5 TABLET, COATED ORAL at 22:12

## 2019-03-02 RX ADMIN — LETROZOLE SCH MG: 2.5 TABLET, FILM COATED ORAL at 09:12

## 2019-03-02 RX ADMIN — ENOXAPARIN SODIUM SCH MG: 100 INJECTION SUBCUTANEOUS at 09:40

## 2019-03-02 RX ADMIN — CEFEPIME SCH MLS/HR: 1 INJECTION, POWDER, FOR SOLUTION INTRAMUSCULAR; INTRAVENOUS at 08:42

## 2019-03-02 RX ADMIN — ATORVASTATIN CALCIUM SCH MG: 10 TABLET, FILM COATED ORAL at 20:44

## 2019-03-02 RX ADMIN — LETROZOLE 1 MG: 2.5 TABLET, FILM COATED ORAL at 09:12

## 2019-03-02 RX ADMIN — MAGNESIUM OXIDE TAB 400 MG (241.3 MG ELEMENTAL MG) SCH MG: 400 (241.3 MG) TAB at 08:43

## 2019-03-02 RX ADMIN — FOLIC ACID SCH MLS/HR: 5 INJECTION, SOLUTION INTRAMUSCULAR; INTRAVENOUS; SUBCUTANEOUS at 13:20

## 2019-03-02 RX ADMIN — CEFEPIME SCH MLS/HR: 1 INJECTION, POWDER, FOR SOLUTION INTRAMUSCULAR; INTRAVENOUS at 20:43

## 2019-03-02 RX ADMIN — MAGNESIUM OXIDE TAB 400 MG (241.3 MG ELEMENTAL MG) 1 MG: 400 (241.3 MG) TAB at 20:43

## 2019-03-02 RX ADMIN — FOLIC ACID SCH MLS/HR: 5 INJECTION, SOLUTION INTRAMUSCULAR; INTRAVENOUS; SUBCUTANEOUS at 00:02

## 2019-03-02 RX ADMIN — ACETAMINOPHEN 1 MG: 325 TABLET, FILM COATED ORAL at 08:43

## 2019-03-02 RX ADMIN — THIAMINE HYDROCHLORIDE 1 MLS/HR: 100 INJECTION, SOLUTION INTRAMUSCULAR; INTRAVENOUS at 13:20

## 2019-03-02 RX ADMIN — ENOXAPARIN SODIUM 1 MG: 100 INJECTION SUBCUTANEOUS at 09:40

## 2019-03-02 RX ADMIN — INSULIN ASPART 1 UNIT: 100 INJECTION, SOLUTION INTRAVENOUS; SUBCUTANEOUS at 09:41

## 2019-03-02 RX ADMIN — ASPIRIN SCH MG: 81 TABLET, COATED ORAL at 08:43

## 2019-03-02 RX ADMIN — CEFEPIME 1 MLS/HR: 1 INJECTION, POWDER, FOR SOLUTION INTRAMUSCULAR; INTRAVENOUS at 08:42

## 2019-03-02 RX ADMIN — INSULIN GLARGINE 1 UNITS: 100 INJECTION, SOLUTION SUBCUTANEOUS at 11:15

## 2019-03-02 RX ADMIN — CEFEPIME 1 MLS/HR: 1 INJECTION, POWDER, FOR SOLUTION INTRAMUSCULAR; INTRAVENOUS at 20:43

## 2019-03-02 RX ADMIN — INSULIN ASPART 1 UNIT: 100 INJECTION, SOLUTION INTRAVENOUS; SUBCUTANEOUS at 17:42

## 2019-03-02 RX ADMIN — ATORVASTATIN CALCIUM 1 MG: 10 TABLET, FILM COATED ORAL at 20:44

## 2019-03-02 RX ADMIN — THIAMINE HYDROCHLORIDE 1 MLS/HR: 100 INJECTION, SOLUTION INTRAMUSCULAR; INTRAVENOUS at 00:02

## 2019-03-02 RX ADMIN — MAGNESIUM OXIDE TAB 400 MG (241.3 MG ELEMENTAL MG) SCH MG: 400 (241.3 MG) TAB at 20:43

## 2019-03-02 RX ADMIN — Medication 1 UNIT: at 09:41

## 2019-03-02 RX ADMIN — INSULIN ASPART 1 UNIT: 100 INJECTION, SOLUTION INTRAVENOUS; SUBCUTANEOUS at 09:40

## 2019-03-02 NOTE — HP
Date/Time of Note


Date/Time of Note


DATE: 3/2/19 


TIME: 12:25





Assessment/Plan


VTE Prophylaxis


Risk score (from Ns)>0 risk:  3


SCD applied (from Ns):  No





Lines/Catheters


IV Catheter Type (from Nrs):  Saline Lock





Assessment/Plan


Assessment/Plan


   Severe sepsis-ID consult done


   UTI (urinary tract infection)


   - Acute cystitis with hematuria


   Pneumonia


   - Lobar pneumonia, unspecified organism


   Tachycardia


   Fever


   Leukocytosis


   Lactic acidosis


   Hyponatremia


   Elevated BUN


   Dehydration


   Elevated alkaline phosphatase level


   Hyperglycemia- Endo consult done


   Hx LEFT BREAST CANCER


Result Diagram:  


3/2/19 0515                                                                     


          3/2/19 0515





Results 24hrs





Laboratory Tests


Test
               3/1/19
15:55  3/1/19
15:57        3/1/19
16:27  3/1/19
17:35


White Blood Count       14.1  #H


Red Blood Count           4.63


Hemoglobin                13.4


Hematocrit                39.0


Mean Corpuscular          84.2


Volume


Mean Corpuscular         28.9  L


Hemoglobin


Mean Corpuscular         34.4  
  
             
                   



Hemoglobin
Concent


Red Cell                  12.2


Distribution Width


Platelet Count            177  #


Mean Platelet            10.7  H


Volume


Immature                0.600  H


Granulocytes %


Neutrophils %            90.8  H


Lymphocytes %             4.9  L


Monocytes %                3.3


Eosinophils %              0.0


Basophils %                0.4


Nucleated Red              0.0


Blood Cells %


Immature                0.080  H


Granulocytes #


Neutrophils #            12.8  H


Lymphocytes #             0.7  L


Monocytes #                0.5


Eosinophils #              0.0


Basophils #                0.1


Nucleated Red              0.0


Blood Cells #


Prothrombin Time         17.3  H


Prothrombin Time           1.4


Ratio


INR [employer]        1.40  
  
             
                   



Normalized
Ratio


Activated               42.5  H
  
             
                   



Partial
Thrombopla


st Time


Sodium Level              129  L


Potassium Level            5.0


Chloride Level             91  L


Carbon Dioxide              21


Level


Anion Gap                  17  H


Blood Urea                 26  H


Nitrogen


Creatinine                0.76


Est Glomerular      > 60  
       
             
                   



Filtrat


Rate
mL/min


Glucose Level            531  *H


Calcium Level             10.1


Phosphorus Level          2.1  L


Magnesium Level            1.9


Total Bilirubin            0.4


Direct Bilirubin          0.00


Indirect Bilirubin         0.4


Aspartate Amino            41  
  
             
                   



Transf
(AST/SGOT)


Alanine                    37  
  
             
                   



Aminotransferase
(


ALT/SGPT)


Alkaline                  197  H


Phosphatase


Troponin I          < 0.012


Total Protein             8.2  H


Albumin                    4.3


Globulin                 3.90  H


Albumin/Globulin          1.10


Ratio


POC Venous Lactate                     3.5  *H


Blood Gas Specimen                              Blood venous


Source


Arterial Blood      
             
               3/1/2019
5:17:55  



Date Drawn
                                                     PM


Arterial Blood Gas  
             
             VENOUS LINE  
      



Puncture
Site


Diogo Test                                      N/A


Venous Blood pH                                            7.380


Venous Blood pCO2   
             
                        36.2  
  



(Temp
Corrected)


Venous Blood pO2    
             
                       22.7  L
  



(Temp
Corrected)


Venous Blood HCO3                                          20.9  L


Venous Blood                                               40.8  L


Oxygen Saturation


Venous Blood Base                                           -3.6


Excess


Venous Blood Total                                          13.2


Hemoglobin


Venous Blood                                                40.6


Oxyhemoglobin


Venous Blood                                                 0.3


Methemoglobin


Carboxyhemoglobin                                            0.3


Blood Gas                                                   37.0


Temperature


Blood Gas Modality                              ROOM AIR


FiO2                                                        21.0


Blood Gas Notified                              CANDY Miramontes


Blood Gas Notified  
             
               3/1/2019
5:22:33  



Time
                                                           PM


Urine Color                                                         YELLOW


Urine Clarity                                                       CLEAR


Urine pH                                                                   6.0


Urine Specific                                                           1.023


Gravity


Urine Ketones                                                              1+  H


Urine Nitrite                                                       NEGATIVE


Urine Bilirubin                                                     NEGATIVE


Urine Urobilinogen                                                  NEGATIVE


Urine Leukocyte                                                     TRACE  A


Esterase


Urine Microscopic                                                            1


RBC


Urine Microscopic                                                          23  H


WBC


Urine Bacteria                                                      FEW  A


Urine Hemoglobin                                                           1+  H


Urine Glucose                                                              3+  H


Urine Total                                                                1+  H


Protein


Test
               3/1/19
18:07  3/1/19
22:05        3/1/19
23:08  3/1/19
23:26


POC Venous Lactate         1.9


Lactic Acid Level                        1.8


Bedside Glucose                                            504  *H


Glucose Level                                                            484  *H


Test
               3/2/19
01:11  3/2/19
02:05        3/2/19
05:15  3/2/19
07:56


Bedside Glucose           386  H        375  H                            367  H


White Blood Count                                          12.0  H


Red Blood Count                                            3.98  L


Hemoglobin                                                 11.5  L


Hematocrit                                                 34.6  L


Mean Corpuscular                                            86.9


Volume


Mean Corpuscular                                           28.9  L


Hemoglobin


Mean Corpuscular    
             
                        33.2  
  



Hemoglobin
Concent


Red Cell                                                    12.7


Distribution Width


Platelet Count                                               147


Mean Platelet                                              11.5  H


Volume


Immature                                                  0.700  H


Granulocytes %


Neutrophils %                                              83.6  H


Lymphocytes %                                               7.2  L


Monocytes %                                                  8.0


Eosinophils %                                                0.1


Basophils %                                                  0.4


Nucleated Red                                                0.0


Blood Cells %


Immature                                                  0.080  H


Granulocytes #


Neutrophils #                                              10.0  H


Lymphocytes #                                                0.9


Monocytes #                                                 1.0  H


Eosinophils #                                                0.0


Basophils #                                                  0.1


Nucleated Red                                                0.0


Blood Cells #


Sodium Level                                                 135


Potassium Level                                              4.1


Chloride Level                                              106  #


Carbon Dioxide                                               20  L


Level


Anion Gap                                                     9  #


Blood Urea                                                    20


Nitrogen


Creatinine                                                  0.46


Est Glomerular      
             
             > 60  
             



Filtrat


Rate
mL/min


Glucose Level                                               349  H


Hemoglobin A1c                                             11.5  H


Calcium Level                                                8.9


Test
               3/2/19
11:53  
             
                   



Bedside Glucose           320  H








HPI/ROS


Admit Date/Time


Admit Date/Time


Mar 1, 2019 at 18:47





ROS


HPI


This is a 64-year-old female with a past medical history of hypertension, 


hyperlipidemia, insulin-dependent diabetes who is presenting with a few weeks of


intermittent fevers, chills, full body aches and pains, dysuria with urinary 


frequency and urgency.  The patient seemed to have come and go over the course 


of a few weeks, but they became more pronounced several days ago.  She endorses 


suprapubic tenderness.  She endorses nausea with a few episodes of nonbilious 


nonbloody vomiting.  She has no flank pain.  She also endorses a congested 


cough, productive of clear/yellow sputum.  She denies chest pain or chest 


tightness or shortness of breath.  She does endorse occasional palpitations.  


She denies any constipation or diarrhea.  She denies any black or bloody or 


tarry stools.





The patient has had no headache or vision changes.  The patient does not endorse


neck or back pain. The patient denies lightheadedness or dizziness.  The patient


has had no focal deficits.  The patient has had no weakness or numbness or 


tingling to the face or extremities.





ROS


All systems reviewed and are negative except as per history of present illness.





Medications


Home Meds


Active Scripts


Ibuprofen* (Motrin*) 800 Mg Tab, 800 MG PO Q6H PRN for PAIN AND OR ELEVATED 


TEMP, #30 TAB


   Prov:LYSSA ROMERO MD         3/25/18


Hydrocodone/Acetaminophen (Norco 5-325 Tablet) 1 Each Tablet, 1 TAB PO Q6H PRN 


for PAIN, #20 TAB


   Prov:REGINA RAZO         3/22/17


Reported Medications


Cholecalciferol (Vitamin D3) (VITAMIN D-3) 2,000 Unit Capsule, 2000 UNIT PO BARBIE


Y


   3/1/19


Atorvastatin Calcium (Atorvastatin Calcium) 10 Mg Tablet, 10 MG PO QHS, #30 TAB


   3/1/19


Amlodipine Besylate* (Amlodipine Besylate*) 5 Mg Tablet, 5 MG PO DAILY


   3/1/19


Letrozole* (Letrozole*) 2.5 Mg Tablet, 2.5 MG PO DAILY


   3/1/19


Aspirin* (Aspirin* EC) 81 Mg Tablet.dr, 81 MG PO DAILY, TAB


   3/1/19


Insulin Isophan/Regular (Humulin 70/30) 100 Units/Ml Susp, 25 UNIT SC QPM, EA


   3/1/19


Insulin Isophan/Regular (Humulin 70/30) 100 Units/Ml Susp, 35 UNIT SC QAM, EA


   3/1/19


Lisinopril* (Lisinopril*) 20 Mg Tablet, 20 MG PO DAILY


   3/1/19


Magnesium Oxide* (Mag-Oxide*) 400 Mg Tablet, 400 MG PO BID


   3/1/19


Quinapril Hcl (Accupril) 10 Mg Tablet, 20 MG PO DAILY


   3/17/14


Metformin* (Glucophage*) 1,000 Mg Tablet, 1000 MG PO BID


   3/17/14


Discontinued Reported Medications


Aspirin Delayed Release (Aspirin Delayed Release) 81 Mg Tablet.dr, 81 MG PO 


DAILY


   3/17/14


Discontinued Scripts


Insulin Aspart* (Novolog Insulin Pen*) 100 Unit/Ml Soln, 5 UNIT SC WITH MEALS 


for 30 Days


   Prov:REGINA RAZO         3/22/17


Insulin Glargine* (Lantus*) 100 Unit/Ml Soln, 16 UNIT SC DAILY@08 for 30 Days


   Prov:REGINA RAZO         3/22/17


Cephalexin* (Cephalexin* Susp) 250 Mg/5 Ml Susp.recon, 500 MG PO Q8 for 7 Days, 


#1 BOTTLE


   Prov:REGINA RAZO         3/22/17


Acetaminophen* (Tylophen*) 500 Mg Capsule, 1 CAP PO Q6H PRN for PAIN AND OR 


ELEVATED TEMP, #20 CAP


   Prov:KIZZY TILLMAN         8/8/15





Allergies


Allergies:  


Coded Allergies:  


     No Known Allergies (Unverified  Allergy, Unknown, 3/16/17)





PMH/Family/Social


Past Medical History





PMhx/Soc


History of Surgery:  Yes (LEFT BREAST INCISION PRIOR TO THIS SURGERY)


Anesthesia Reaction:  No


Hx Neurological Disorder:  No


Hx Respiratory Disorders:  No


Hx Cardiac Disorders:  Yes (Hypertension, hyperlipidemia, insulin-dependent 


diabetes)


Hx Psychiatric Problems:  No


Hx Miscellaneous Medical Probl:  No (BREAST CA)


Hx Alcohol Use:  No


Hx Substance Use:  No


Hx Tobacco Use:  No


Smoking Status:  Never smoker





FmHx


Family History:  diabetes


Medications





Current Medications


Cefepime HCl 50 ml @  100 mls/hr BID IVPB  Last administered on 3/2/19at 08:42; 


Admin Dose 100 MLS/HR;  Start 3/1/19 at 23:00


Acetaminophen (Tylenol Tab) 650 mg Q4H  PRN PO MILD PAIN(1-3)OR ELEVATED TEMP 


Last administered on 3/2/19at 08:43; Admin Dose 650 MG;  Start 3/1/19 at 23:00


Enoxaparin Sodium (Lovenox) 40 mg DAILY SC  Last administered on 3/2/19at 09:40;


Admin Dose 40 MG;  Start 3/2/19 at 09:00


Insulin Glargine (Lantus) 30 units DAILY SC  Last administered on 3/2/19at 


11:15; Admin Dose 30 UNITS;  Start 3/2/19 at 09:00


Insulin Aspart (Novolog Insulin Pen) 10 unit WITH  MEALS SC  Last administered 


on 3/2/19at 09:41; Admin Dose 10 UNIT;  Start 3/2/19 at 08:00


Aspirin (Halfprin) 81 mg DAILY PO  Last administered on 3/2/19at 08:43; Admin 


Dose 81 MG;  Start 3/2/19 at 09:00


Atorvastatin Calcium (Lipitor) 10 mg HS PO ;  Start 3/2/19 at 21:00


Cholecalciferol (Vitamin D) 2,000 unit DAILY PO  Last administered on 3/2/19at 


09:41; Admin Dose 2,000 UNIT;  Start 3/2/19 at 09:00


Acetaminophen/ Hydrocodone Bitart (Norco (5/325)) 1 tab Q6H  PRN PO MODERATE 


PAIN LEVEL 4-6;  Start 3/1/19 at 23:00


Ibuprofen (Motrin) 800 mg Q8H  PRN PO PAIN;  Start 3/1/19 at 23:00


Letrozole (Femara) 2.5 mg DAILY PO  Last administered on 3/2/19at 09:12; Admin 


Dose 2.5 MG;  Start 3/2/19 at 09:00


Magnesium Oxide (Mag-Ox 400) 400 mg BID PO  Last administered on 3/2/19at 08:43;


Admin Dose 400 MG;  Start 3/2/19 at 09:00


Miscellaneous Information 1 ea NOTE XX ;  Start 3/1/19 at 23:30


Glucose (Glutose) 15 gm Q15M  PRN PO DECREASED GLUCOSE;  Start 3/1/19 at 23:30


Glucose (Glutose) 22.5 gm Q15M  PRN PO DECREASED GLUCOSE;  Start 3/1/19 at 23:30


Dextrose (D50w Syringe) 25 ml Q15M  PRN IV DECREASED GLUCOSE;  Start 3/1/19 at 


23:30


Dextrose (D50w Syringe) 50 ml Q15M  PRN IV DECREASED GLUCOSE;  Start 3/1/19 at 2


3:30


Glucagon (Glucagen) 1 mg Q15M  PRN IM DECREASED GLUCOSE;  Start 3/1/19 at 23:30


Glucose (Glutose) 15 gm Q15M  PRN BUCCAL DECREASED GLUCOSE;  Start 3/1/19 at 


23:30


Diagnostic Test (Pha) (Accu-Chek) 1 ea 02 XX ;  Start 3/2/19 at 02:00


Insulin Aspart (Novolog Insulin Pen) NOVOLOG *MILD* ALGORITHM WITH MEALS  


BEDTIME SC  Last administered on 3/2/19at 09:40; Admin Dose 7 UNIT;  Start 


3/2/19 at 08:00


Sodium Chloride 1,000 ml @  75 mls/hr [id number] IV  Last administered on 3/2/19at 


00:02; Admin Dose 75 MLS/HR;  Start 3/2/19 at 00:00


Coded Allergies:  


     No Known Allergies (Unverified  Allergy, Unknown, 3/16/17)





Social History


Smoking Status:  Never smoker





Exam/Review of Systems


Vital Signs


Vitals





Vital Signs


  Date      Temp   Pulse  Resp  B/P (MAP)   Pulse Ox  O2         O2 Flow    FiO2


Time                                                  Delivery   Rate


    3/2/19           104


     12:07


    3/2/19  100.2


     11:26


    3/2/19                  18      105/52        95


     11:21                            (69)


    3/1/19                                            Room Air


     21:00








Intake and Output





3/1/19


3/1/19


3/2/19





1515:00


23:00


07:00





IntakeIntake Total


400 ml





BalanceBalance


400 ml














Exam


Constitutional:  alert, well developed


Psych:  nl mood/affect


Eyes:  nl lids, nl sclera


Respiratory:  diminished breath sounds











KIZZY TILLMAN                  Mar 2, 2019 12:32

## 2019-03-02 NOTE — CONS
DATE OF ADMISSION: 03/01/2019

DATE OF CONSULTATION:  03/02/2019

 

 

 

TYPE OF CONSULTATION:  Infectious Disease

 

REASON FOR CONSULTATION:  Antibiotic management.

 

HISTORY OF PRESENT ILLNESS:  Tahmina Fernando is a 64-year-old  female who was brought in by 
family with fever, aches, and pains lasting for a few weeks.

 

Her past problems include:

1.  Hypertension.

2.  Hyperlipidemia.

3.  Insulin-dependent diabetes mellitus.

 

She presents with fever, chills, body aches and pains, dysuria with urinary frequency and urgency.  T
he patient had intermittent symptoms over the last few weeks, but worse over the last few days.  She 
also has suprapubic tenderness.  She has had nausea with occasional bouts of vomiting.  She has no fl
ank pain.  She complains of cough, congestion, and yellow clear sputum.  She has occasional palpitati
ons.  She does not complain of any particular weakness.  She was on Keflex at home.

 

PAST SURGICAL HISTORY:  She had left breast incision for breast cancer.

 

PAST MEDICAL HISTORY:  As outlined.

 

FAMILY HISTORY:  Noncontributory.

 

SOCIAL HISTORY:  She does not smoke, drink or abuse drugs.

 

ALLERGIES:  NONE TO PENICILLIN, SULFA OR FOODS.

 

MEDICATIONS:  Per chart.

 

REVIEW OF SYSTEMS:  Noncontributory.

 

PHYSICAL EXAMINATION:

VITAL SIGNS:  In the emergency room, temperature was up to 102.3, pulse of 129, respirations 24, puls
e ox is good at 96.  Blood pressure is adequate.

GENERAL:  She is in no apparent distress.

SKIN:  Without generalized rash.

HEENT:  Within normal limits.

NECK:  Supple.

LYMPH NODES:  None palpable.

CHEST:  Decreased breath sounds at the bases.

HEART:  Without murmur or gallop.  She is tachycardic.

ABDOMEN:  Soft, nontender.  She has suprapubic tenderness.

EXTREMITIES:  Without cyanosis, clubbing or edema.  Without CVA tenderness.

RECTAL AND GENITAL:  Deferred.

NEUROLOGIC:  No focal neurological abnormality.

 

LABORATORY DATA:  On the 1st, her white count was 14.1, H and H of 13.4 and 39, platelet count 177.  
BUN and creatinine 26/0.76 and random glucose was 531 on admission.  Her urine showed trace leukocyte
 esterase, 23 white cells per high-power field.  She was started on vancomycin and cefepime.  She had
 a leukocytosis with left shift.

 

IMPRESSION AND PLAN:  Probable urinary tract infection.  She also has mild right lower lobe infiltrat
e.  She presented with symptoms consistent with sepsis with fever and tachycardia and criteria for sy
stemic inflammatory response syndrome.  She also has leukocytosis and lactic acidosis.  We will abisai
nue her on this current regimen.  Of note is the fact that her blood culture is now positive for gram
-negative rods consistent with the diagnosis of urinary tract infection with sepsis.  I believe that 
if the cultures remain as is, we will discontinue her vancomycin.  White count today is 12,000.  In a
ctuality, we will discontinue her vancomycin and it has been discontinued, so will continue on cefepi
me for urinary tract infection with sepsis.  I will dictate my findings to Dr. Patel.

 

 

Dictated By: JERROLD DREYER MD

 

TIN/NTS

DD:    03/02/2019 12:01:35

DT:    03/02/2019 17:07:45

Conf#: [id number]

DID#:  [id number]

CC: BRYAN PATEL MD;*EndCC*

## 2019-03-03 VITALS — RESPIRATION RATE: 17 BRPM | SYSTOLIC BLOOD PRESSURE: 120 MMHG | HEART RATE: 98 BPM | DIASTOLIC BLOOD PRESSURE: 56 MMHG

## 2019-03-03 VITALS — RESPIRATION RATE: 16 BRPM | SYSTOLIC BLOOD PRESSURE: 132 MMHG | DIASTOLIC BLOOD PRESSURE: 60 MMHG | HEART RATE: 99 BPM

## 2019-03-03 VITALS — SYSTOLIC BLOOD PRESSURE: 119 MMHG | DIASTOLIC BLOOD PRESSURE: 60 MMHG | HEART RATE: 89 BPM | RESPIRATION RATE: 18 BRPM

## 2019-03-03 VITALS — HEART RATE: 88 BPM

## 2019-03-03 VITALS — HEART RATE: 100 BPM

## 2019-03-03 VITALS — RESPIRATION RATE: 17 BRPM | SYSTOLIC BLOOD PRESSURE: 120 MMHG | DIASTOLIC BLOOD PRESSURE: 59 MMHG | HEART RATE: 95 BPM

## 2019-03-03 VITALS — RESPIRATION RATE: 20 BRPM | SYSTOLIC BLOOD PRESSURE: 114 MMHG | HEART RATE: 91 BPM | DIASTOLIC BLOOD PRESSURE: 56 MMHG

## 2019-03-03 VITALS — HEART RATE: 95 BPM | SYSTOLIC BLOOD PRESSURE: 114 MMHG | DIASTOLIC BLOOD PRESSURE: 55 MMHG | RESPIRATION RATE: 20 BRPM

## 2019-03-03 VITALS — SYSTOLIC BLOOD PRESSURE: 124 MMHG | HEART RATE: 92 BPM | DIASTOLIC BLOOD PRESSURE: 59 MMHG | RESPIRATION RATE: 20 BRPM

## 2019-03-03 VITALS — HEART RATE: 111 BPM

## 2019-03-03 VITALS — HEART RATE: 96 BPM

## 2019-03-03 RX ADMIN — INSULIN ASPART 1 UNIT: 100 INJECTION, SOLUTION INTRAVENOUS; SUBCUTANEOUS at 17:55

## 2019-03-03 RX ADMIN — ASPIRIN 1 MG: 81 TABLET, COATED ORAL at 08:52

## 2019-03-03 RX ADMIN — CEFEPIME SCH MLS/HR: 1 INJECTION, POWDER, FOR SOLUTION INTRAMUSCULAR; INTRAVENOUS at 22:02

## 2019-03-03 RX ADMIN — MAGNESIUM OXIDE TAB 400 MG (241.3 MG ELEMENTAL MG) SCH MG: 400 (241.3 MG) TAB at 20:23

## 2019-03-03 RX ADMIN — MAGNESIUM OXIDE TAB 400 MG (241.3 MG ELEMENTAL MG) 1 MG: 400 (241.3 MG) TAB at 08:51

## 2019-03-03 RX ADMIN — INSULIN ASPART 1 UNIT: 100 INJECTION, SOLUTION INTRAVENOUS; SUBCUTANEOUS at 18:12

## 2019-03-03 RX ADMIN — SENNOSIDES SCH TAB: 8.6 TABLET, FILM COATED ORAL at 20:23

## 2019-03-03 RX ADMIN — CEFEPIME 1 MLS/HR: 1 INJECTION, POWDER, FOR SOLUTION INTRAMUSCULAR; INTRAVENOUS at 08:52

## 2019-03-03 RX ADMIN — FOLIC ACID SCH MLS/HR: 5 INJECTION, SOLUTION INTRAMUSCULAR; INTRAVENOUS; SUBCUTANEOUS at 01:53

## 2019-03-03 RX ADMIN — LETROZOLE SCH MG: 2.5 TABLET, FILM COATED ORAL at 08:55

## 2019-03-03 RX ADMIN — LEVOFLOXACIN SCH MG: 500 TABLET, FILM COATED ORAL at 16:46

## 2019-03-03 RX ADMIN — CEFEPIME 1 MLS/HR: 1 INJECTION, POWDER, FOR SOLUTION INTRAMUSCULAR; INTRAVENOUS at 22:02

## 2019-03-03 RX ADMIN — SENNOSIDES 1 TAB: 8.6 TABLET, FILM COATED ORAL at 08:52

## 2019-03-03 RX ADMIN — MAGNESIUM OXIDE TAB 400 MG (241.3 MG ELEMENTAL MG) SCH MG: 400 (241.3 MG) TAB at 08:51

## 2019-03-03 RX ADMIN — ASPIRIN SCH MG: 81 TABLET, COATED ORAL at 08:52

## 2019-03-03 RX ADMIN — CEFEPIME SCH MLS/HR: 1 INJECTION, POWDER, FOR SOLUTION INTRAMUSCULAR; INTRAVENOUS at 08:52

## 2019-03-03 RX ADMIN — LEVOFLOXACIN 1 MG: 500 TABLET, FILM COATED ORAL at 16:46

## 2019-03-03 RX ADMIN — SENNOSIDES 1 TAB: 8.6 TABLET, FILM COATED ORAL at 20:23

## 2019-03-03 RX ADMIN — INSULIN ASPART 1 UNIT: 100 INJECTION, SOLUTION INTRAVENOUS; SUBCUTANEOUS at 07:47

## 2019-03-03 RX ADMIN — INSULIN ASPART 1 UNIT: 100 INJECTION, SOLUTION INTRAVENOUS; SUBCUTANEOUS at 20:23

## 2019-03-03 RX ADMIN — Medication 1 UNIT: at 08:52

## 2019-03-03 RX ADMIN — ENOXAPARIN SODIUM 1 MG: 100 INJECTION SUBCUTANEOUS at 08:54

## 2019-03-03 RX ADMIN — INSULIN ASPART 1 UNIT: 100 INJECTION, SOLUTION INTRAVENOUS; SUBCUTANEOUS at 12:48

## 2019-03-03 RX ADMIN — INSULIN GLARGINE SCH UNITS: 100 INJECTION, SOLUTION SUBCUTANEOUS at 08:54

## 2019-03-03 RX ADMIN — SENNOSIDES SCH TAB: 8.6 TABLET, FILM COATED ORAL at 08:52

## 2019-03-03 RX ADMIN — ENOXAPARIN SODIUM SCH MG: 100 INJECTION SUBCUTANEOUS at 08:54

## 2019-03-03 RX ADMIN — ATORVASTATIN CALCIUM 1 MG: 10 TABLET, FILM COATED ORAL at 20:23

## 2019-03-03 RX ADMIN — FOLIC ACID SCH MLS/HR: 5 INJECTION, SOLUTION INTRAMUSCULAR; INTRAVENOUS; SUBCUTANEOUS at 13:36

## 2019-03-03 RX ADMIN — LINAGLIPTIN 1 MG: 5 TABLET, FILM COATED ORAL at 08:52

## 2019-03-03 RX ADMIN — LETROZOLE 1 MG: 2.5 TABLET, FILM COATED ORAL at 08:55

## 2019-03-03 RX ADMIN — INSULIN ASPART 1 UNIT: 100 INJECTION, SOLUTION INTRAVENOUS; SUBCUTANEOUS at 09:12

## 2019-03-03 RX ADMIN — ACETAMINOPHEN 1 MG: 325 TABLET, FILM COATED ORAL at 18:06

## 2019-03-03 RX ADMIN — MAGNESIUM OXIDE TAB 400 MG (241.3 MG ELEMENTAL MG) 1 MG: 400 (241.3 MG) TAB at 20:23

## 2019-03-03 RX ADMIN — EMPAGLIFLOZIN SCH MG: 10 TABLET, FILM COATED ORAL at 08:51

## 2019-03-03 RX ADMIN — ATORVASTATIN CALCIUM SCH MG: 10 TABLET, FILM COATED ORAL at 20:23

## 2019-03-03 RX ADMIN — EMPAGLIFLOZIN 1 MG: 10 TABLET, FILM COATED ORAL at 08:51

## 2019-03-03 RX ADMIN — LINAGLIPTIN SCH MG: 5 TABLET, FILM COATED ORAL at 08:52

## 2019-03-03 RX ADMIN — INSULIN ASPART 1 UNIT: 100 INJECTION, SOLUTION INTRAVENOUS; SUBCUTANEOUS at 13:35

## 2019-03-03 RX ADMIN — THIAMINE HYDROCHLORIDE 1 MLS/HR: 100 INJECTION, SOLUTION INTRAMUSCULAR; INTRAVENOUS at 01:53

## 2019-03-03 RX ADMIN — THIAMINE HYDROCHLORIDE 1 MLS/HR: 100 INJECTION, SOLUTION INTRAMUSCULAR; INTRAVENOUS at 13:36

## 2019-03-03 RX ADMIN — INSULIN GLARGINE 1 UNITS: 100 INJECTION, SOLUTION SUBCUTANEOUS at 08:54

## 2019-03-03 NOTE — CONS
Assessment/Plan


Assessment/Plan


Problems:  


(1) Pneumonia


Status:  Acute


Comment:  Per primary team


Qualifiers:  


   Qualified Codes:  J18.1 - Lobar pneumonia, unspecified organism


(2) Acute pyelonephritis


Status:  Acute


Comment:  Per primary team





(3) Type 2 diabetes mellitus with hyperglycemia


Status:  Chronic


Comment:  BG has been OOC at home which created the risk for this multifocal 


infection.  Insulin doses have been inadequate and probably mistimed.  Will 


restart metformin at 500 mg bid and add linagliptin 5 mg daily and empagliflozin


10 mg daily.  Will increase Lantus from 30 to 36 units daily and Novolog from 10


to 15 and now to 18 units qac.  Will follow with you and titrate meds to BG goal


100-180 mg/dL.


Qualifiers:  


   Qualified Codes:  E11.65 - Type 2 diabetes mellitus with hyperglycemia; Z79.4


- Long term (current) use of insulin





Consultation Date/Type/Reason


Admit Date/Time


Mar 1, 2019 at 18:47


Date of Consultation:  Mar 3, 2019


Type of Consult


Endocrinology


Reason for Consultation


T2DM Out Of Control (OOC)


Requesting Provider:  KIZZY TILLMAN


Date/Time of Note


DATE: 3/3/19 


TIME: 09:17





Hx of Present Illness


63 y/o H F w/ h/o BrCA, T2DM, HTN, hyperlipidemia in [facility] until 3 weeks ago when 


she developed HA.  This progressed to loss of appetite and nausea.  Subsequently


developed generalized body aches.  (+) subjective fever, chills, sweats, 


palpitations.  Pt. did not have emesis but became progressively worse.  Did not 


want to come to hospital but by 2 days ago could no longer tolerate her 


symptoms.  In ER glucose was 531 mg/dL, sodium and potassium were mildly low, 


anion gap was open, there was elevated lactate in the blood at 3.5, febrile and 


tachycardic.  CXR showed RLL infiltrate.  (+) mild leukocytosis, (+) WBC and RBC


in the urine.  Cultures have grown gram (-) rods in the blood and klebsiella 


pneumoniae in the urine.  A1c 11.5%.  Pt. started on subcutaneous insulin 


protocol and endo consulted.


Constitutional:  chills, diaphoresis, febrile, poor po


Eyes:  no complaints


ENT:  no complaints


Respiratory:  cough


Cardiovascular:  palpitations


Gastrointestinal:  pain, decreased appetite, nausea; 


   No vomiting


Genitourinary:  no complaints


Musculoskeletal:  no complaints


Neurologic:  no complaints





Past Medical History


Medical History:  cancer (breast), diabetes, high cholesterol, hypertension


Home Meds


Active Scripts


Ibuprofen* (Motrin*) 800 Mg Tab, 800 MG PO Q6H PRN for PAIN AND OR ELEVATED 


TEMP, #30 TAB


   Prov:LYSSA ROMERO MD         3/25/18


Hydrocodone/Acetaminophen (Norco 5-325 Tablet) 1 Each Tablet, 1 TAB PO Q6H PRN 


for PAIN, #20 TAB


   Prov:REGINA RAZO         3/22/17


Reported Medications


Cholecalciferol (Vitamin D3) (VITAMIN D-3) 2,000 Unit Capsule, 2000 UNIT PO 


DAILY


   3/1/19


Atorvastatin Calcium (Atorvastatin Calcium) 10 Mg Tablet, 10 MG PO QHS, #30 TAB


   3/1/19


Amlodipine Besylate* (Amlodipine Besylate*) 5 Mg Tablet, 5 MG PO DAILY


   3/1/19


Letrozole* (Letrozole*) 2.5 Mg Tablet, 2.5 MG PO DAILY


   3/1/19


Aspirin* (Aspirin* EC) 81 Mg Tablet.dr, 81 MG PO DAILY, TAB


   3/1/19


Insulin Isophan/Regular (Humulin 70/30) 100 Units/Ml Susp, 25 UNIT SC QPM, EA


   3/1/19


Insulin Isophan/Regular (Humulin 70/30) 100 Units/Ml Susp, 35 UNIT SC QAM, EA


   3/1/19


Lisinopril* (Lisinopril*) 20 Mg Tablet, 20 MG PO DAILY


   3/1/19


Magnesium Oxide* (Mag-Oxide*) 400 Mg Tablet, 400 MG PO BID


   3/1/19


Quinapril Hcl (Accupril) 10 Mg Tablet, 20 MG PO DAILY


   3/17/14


Metformin* (Glucophage*) 1,000 Mg Tablet, 1000 MG PO BID


   3/17/14


Discontinued Reported Medications


Aspirin Delayed Release (Aspirin Delayed Release) 81 Mg Tablet.dr, 81 MG PO 


DAILY


   3/17/14


Discontinued Scripts


Insulin Aspart* (Novolog Insulin Pen*) 100 Unit/Ml Soln, 5 UNIT SC WITH MEALS 


for 30 Days


   Prov:REGINA RAZO         3/22/17


Insulin Glargine* (Lantus*) 100 Unit/Ml Soln, 16 UNIT SC DAILY@08 for 30 Days


   Prov:REGINA RAZO         3/22/17


Cephalexin* (Cephalexin* Susp) 250 Mg/5 Ml Susp.recon, 500 MG PO Q8 for 7 Days, 


#1 BOTTLE


   Prov:REGINA RAZO         3/22/17


Acetaminophen* (Tylophen*) 500 Mg Capsule, 1 CAP PO Q6H PRN for PAIN AND OR 


ELEVATED TEMP, #20 CAP


   Prov:KIZZY TILLMAN         8/8/15


Medications





Current Medications


Cefepime HCl 50 ml @  100 mls/hr BID IVPB  Last administered on 3/3/19at 08:52; 


Admin Dose 100 MLS/HR;  Start 3/1/19 at 23:00


Acetaminophen (Tylenol Tab) 650 mg Q4H  PRN PO MILD PAIN(1-3)OR ELEVATED TEMP 


Last administered on 3/2/19at 08:43; Admin Dose 650 MG;  Start 3/1/19 at 23:00


Enoxaparin Sodium (Lovenox) 40 mg DAILY SC  Last administered on 3/3/19at 08:54;


Admin Dose 40 MG;  Start 3/2/19 at 09:00


Aspirin (Halfprin) 81 mg DAILY PO  Last administered on 3/3/19at 08:52; Admin 


Dose 81 MG;  Start 3/2/19 at 09:00


Atorvastatin Calcium (Lipitor) 10 mg HS PO  Last administered on 3/2/19at 20:44;


Admin Dose 10 MG;  Start 3/2/19 at 21:00


Cholecalciferol (Vitamin D) 2,000 unit DAILY PO  Last administered on 3/3/19at 


08:52; Admin Dose 2,000 UNIT;  Start 3/2/19 at 09:00


Acetaminophen/ Hydrocodone Bitart (Norco (5/325)) 1 tab Q6H  PRN PO MODERATE 


PAIN LEVEL 4-6;  Start 3/1/19 at 23:00


Ibuprofen (Motrin) 800 mg Q8H  PRN PO PAIN;  Start 3/1/19 at 23:00


Letrozole (Femara) 2.5 mg DAILY PO  Last administered on 3/3/19at 08:55; Admin 


Dose 2.5 MG;  Start 3/2/19 at 09:00


Magnesium Oxide (Mag-Ox 400) 400 mg BID PO  Last administered on 3/3/19at 08:51;


Admin Dose 400 MG;  Start 3/2/19 at 09:00


Miscellaneous Information 1 ea NOTE XX ;  Start 3/1/19 at 23:30


Glucose (Glutose) 15 gm Q15M  PRN PO DECREASED GLUCOSE;  Start 3/1/19 at 23:30


Glucose (Glutose) 22.5 gm Q15M  PRN PO DECREASED GLUCOSE;  Start 3/1/19 at 23:30


Dextrose (D50w Syringe) 25 ml Q15M  PRN IV DECREASED GLUCOSE;  Start 3/1/19 at 


23:30


Dextrose (D50w Syringe) 50 ml Q15M  PRN IV DECREASED GLUCOSE;  Start 3/1/19 at 


23:30


Glucagon (Glucagen) 1 mg Q15M  PRN IM DECREASED GLUCOSE;  Start 3/1/19 at 23:30


Glucose (Glutose) 15 gm Q15M  PRN BUCCAL DECREASED GLUCOSE;  Start 3/1/19 at 


23:30


Diagnostic Test (Pha) (Accu-Chek) 1 ea 02 XX  Last administered on 3/3/19at 


01:50; Admin Dose 1 EA;  Start 3/2/19 at 02:00


Insulin Aspart (Novolog Insulin Pen) NOVOLOG *MILD* ALGORITHM WITH MEALS  


BEDTIME SC  Last administered on 3/3/19at 07:47; Admin Dose 4 UNIT;  Start 


3/2/19 at 08:00


Sodium Chloride 1,000 ml @  75 mls/hr [id number] IV  Last administered on 3/2/19at 


13:20; Admin Dose 75 MLS/HR;  Start 3/2/19 at 00:00


Metformin HCl (Glucophage) 500 mg BID WITH  MEALS PO  Last administered on 


3/3/19at 08:51; Admin Dose 500 MG;  Start 3/2/19 at 18:00


Diagnostic Test (Pha) (Accu-Chek) 1 ea AC MEALS AND  BEDTIME XX  Last 


administered on 3/3/19at 07:03; Admin Dose 1 EA;  Start 3/2/19 at 17:30


Linagliptin (Tradjenta) 5 mg DAILY PO  Last administered on 3/3/19at 08:52; 


Admin Dose 5 MG;  Start 3/3/19 at 09:00


Empaglifozin (Jardiance) 10 mg DAILY@08 PO  Last administered on 3/3/19at 08:51;


Admin Dose 10 MG;  Start 3/3/19 at 08:00


Senna (Senokot) 1 tab BID PO  Last administered on 3/3/19at 08:52; Admin Dose 1 


TAB;  Start 3/3/19 at 09:00


Insulin Aspart (Novolog Insulin Pen) 18 unit WITH  MEALS SC  Last administered 


on 3/3/19at 09:12; Admin Dose 18 UNIT;  Start 3/3/19 at 08:00


Insulin Glargine (Lantus) 36 units DAILY SC  Last administered on 3/3/19at 


08:54; Admin Dose 36 UNITS;  Start 3/3/19 at 09:00


Allergies:  


Coded Allergies:  


     No Known Allergies (Unverified  Allergy, Unknown, 3/16/17)





Past Surgical History


Past Surgical Hx:  other (L mastectomy)





Family History


Significant Family History:  cancer (stomach in mother), diabetes





Social History


b. [facility], in [facility] since 1974, ret'd or disabled [employer] 


, , no children


Alcohol Use:  none


Smoking Status:  Never smoker


Drug Use:  none





Exam/Review of Systems


Exam


Vitals





                          VS - Last 72 Hours, by Label


  Date      Temp   Pulse  Resp  B/P (MAP)   Pulse Ox  O2         O2 Flow    FiO2


Time                                                  Delivery   Rate


    3/3/19            88


     08:17


    3/3/19   98.4     91    20      114/56        95


     07:18                            (75)


    3/3/19   98.6     98    17      120/56        97


     04:17                            (77)


    3/3/19           100


     04:00


    3/3/19   98.4     95    17      120/59        98


     00:30                            (79)


    3/3/19           111


     00:00


    3/2/19           105


     20:00


    3/2/19   98.8    105    17      118/56        98


     19:55                            (76)


    3/2/19           112


     16:16


    3/2/19   98.7    104    20      103/71        97


     15:34                            (82)


    3/2/19           104


     12:07


    3/2/19  100.2


     11:26


    3/2/19   98.5    107    18      105/52        95


     11:21                            (69)


    3/2/19  100.2               95/53 (67)


     10:39


    3/2/19  102.2


     08:43


    3/2/19           120


     08:31


    3/2/19  102.2    120    18      123/60        94


     08:16                            (81)


    3/2/19   98.5    105    18      105/55        97


     04:04                            (72)


    3/2/19           103


     04:00


    3/2/19            98


     00:00


    3/1/19   98.0    100    18  99/52 (68)        96


     23:44


    3/1/19           108


     21:27


    3/1/19   98.2    108    18  92/55 (67)        93  Room Air


     21:00


    3/1/19           110    20      102/57        97


     20:37                            (72)


    3/1/19  100.5    120    30  94/56 (69)        95  Room Air


     18:45


    3/1/19  102.3    129    24      119/60        96  Room Air


     17:46                            (79)


    3/1/19  102.3


     17:44


    3/1/19  100.3    127    24      134/64        95  Room Air


     16:07                            (87)


    3/1/19  101.2    131    20      141/67        96


     15:26                            (91)





Vital Signs


  Date      Temp  Pulse  Resp  B/P (MAP)   Pulse Ox  O2          O2 Flow    FiO2


Time                                                 Delivery    Rate


    3/3/19           88


     08:17


    3/3/19  98.4           20      114/56        95


     07:18                           (75)


    3/1/19                                           Room Air


     21:00








Intake and Output





3/2/19


3/2/19


3/3/19





1414:59


22:59


06:59





IntakeIntake Total


1900 ml


1450 ml





BalanceBalance


1900 ml


1450 ml











Constitutional:  alert, oriented, well developed


Psych:  no complaints, nl mood/affect


Eyes:  nl conjunctiva, EOMI, nl lids, nl sclera, PERRL


ENMT:  nl external ears & nose, mucosa pink and moist


Neck:  supple, non-tender; 


   No bruits, No masses, No thyromegaly


Respiratory:  crackles/rales (Left lower lobe)


Cardiovascular:  regular rate and rhythm, nl pulses; 


   No edema, No murmurs/extra sounds, No rub


Gastrointestinal:  soft, nl liver, spleen, non-tender, bowel sounds; 


   No mass, No rebound or guarding


Musculoskeletal:  nl extremities to inspection


Extremities:  normal pulses; 


   No cyanosis, No clubbing, No edema


Neurological:  CNS II-XII intact, nl mental status, nl speech, nl strength


Additional Comments





Bedside Glucose - 72 Hours


Test
               3/1/19
23:08    3/2/19
01:11    3/2/19
02:05    3/2/19
07:56


Bedside                      504             386             375             367


Glucose
         mg/dL
()   mg/dL
()


                              *H               H               H               H


Test
               3/2/19
11:53    3/2/19
17:36    3/2/19
20:42    3/3/19
01:42


Bedside                      320             309             280             252


Glucose
         mg/dL
()   mg/dL
()


                               H               H               H               H


Test
               3/3/19
07:00    3/3/19
07:25  
               



Bedside                      283             268  
               



Glucose
         mg/dL
()   mg/dL
()


                               H               H








Results


Result Diagram:  


3/2/19 0515                                                                     


          3/2/19 0515





Results 24hrs





Laboratory Tests


    Test
            3/2/19
11:53  3/2/19
17:36  3/2/19
20:42  3/3/19
01:42


    Bedside Glucose        320  H        309  H        280  H        252  H


    Test
            3/3/19
07:00  3/3/19
07:25  
             



    Bedside Glucose        283  H        268  H








Medications


Medication





Current Medications


Cefepime HCl 50 ml @  100 mls/hr BID IVPB  Last administered on 3/3/19at 08:52; 


Admin Dose 100 MLS/HR;  Start 3/1/19 at 23:00


Acetaminophen (Tylenol Tab) 650 mg Q4H  PRN PO MILD PAIN(1-3)OR ELEVATED TEMP 


Last administered on 3/2/19at 08:43; Admin Dose 650 MG;  Start 3/1/19 at 23:00


Enoxaparin Sodium (Lovenox) 40 mg DAILY SC  Last administered on 3/3/19at 08:54;


Admin Dose 40 MG;  Start 3/2/19 at 09:00


Aspirin (Halfprin) 81 mg DAILY PO  Last administered on 3/3/19at 08:52; Admin 


Dose 81 MG;  Start 3/2/19 at 09:00


Atorvastatin Calcium (Lipitor) 10 mg HS PO  Last administered on 3/2/19at 20:44;


Admin Dose 10 MG;  Start 3/2/19 at 21:00


Cholecalciferol (Vitamin D) 2,000 unit DAILY PO  Last administered on 3/3/19at 0


8:52; Admin Dose 2,000 UNIT;  Start 3/2/19 at 09:00


Acetaminophen/ Hydrocodone Bitart (Norco (5/325)) 1 tab Q6H  PRN PO MODERATE 


PAIN LEVEL 4-6;  Start 3/1/19 at 23:00


Ibuprofen (Motrin) 800 mg Q8H  PRN PO PAIN;  Start 3/1/19 at 23:00


Letrozole (Femara) 2.5 mg DAILY PO  Last administered on 3/3/19at 08:55; Admin 


Dose 2.5 MG;  Start 3/2/19 at 09:00


Magnesium Oxide (Mag-Ox 400) 400 mg BID PO  Last administered on 3/3/19at 08:51;


Admin Dose 400 MG;  Start 3/2/19 at 09:00


Miscellaneous Information 1 ea NOTE XX ;  Start 3/1/19 at 23:30


Glucose (Glutose) 15 gm Q15M  PRN PO DECREASED GLUCOSE;  Start 3/1/19 at 23:30


Glucose (Glutose) 22.5 gm Q15M  PRN PO DECREASED GLUCOSE;  Start 3/1/19 at 23:30


Dextrose (D50w Syringe) 25 ml Q15M  PRN IV DECREASED GLUCOSE;  Start 3/1/19 at 


23:30


Dextrose (D50w Syringe) 50 ml Q15M  PRN IV DECREASED GLUCOSE;  Start 3/1/19 at 


23:30


Glucagon (Glucagen) 1 mg Q15M  PRN IM DECREASED GLUCOSE;  Start 3/1/19 at 23:30


Glucose (Glutose) 15 gm Q15M  PRN BUCCAL DECREASED GLUCOSE;  Start 3/1/19 at 


23:30


Diagnostic Test (Pha) (Accu-Chek) 1 ea 02 XX  Last administered on 3/3/19at 


01:50; Admin Dose 1 EA;  Start 3/2/19 at 02:00


Insulin Aspart (Novolog Insulin Pen) NOVOLOG *MILD* ALGORITHM WITH MEALS  


BEDTIME SC  Last administered on 3/3/19at 07:47; Admin Dose 4 UNIT;  Start 


3/2/19 at 08:00


Sodium Chloride 1,000 ml @  75 mls/hr [id number] IV  Last administered on 3/2/19at 


13:20; Admin Dose 75 MLS/HR;  Start 3/2/19 at 00:00


Metformin HCl (Glucophage) 500 mg BID WITH  MEALS PO  Last administered on 


3/3/19at 08:51; Admin Dose 500 MG;  Start 3/2/19 at 18:00


Diagnostic Test (Pha) (Accu-Chek) 1 ea AC MEALS AND  BEDTIME XX  Last 


administered on 3/3/19at 07:03; Admin Dose 1 EA;  Start 3/2/19 at 17:30


Linagliptin (Tradjenta) 5 mg DAILY PO  Last administered on 3/3/19at 08:52; 


Admin Dose 5 MG;  Start 3/3/19 at 09:00


Empaglifozin (Jardiance) 10 mg DAILY@08 PO  Last administered on 3/3/19at 08:51;


Admin Dose 10 MG;  Start 3/3/19 at 08:00


Senna (Senokot) 1 tab BID PO  Last administered on 3/3/19at 08:52; Admin Dose 1 


TAB;  Start 3/3/19 at 09:00


Insulin Aspart (Novolog Insulin Pen) 18 unit WITH  MEALS SC  Last administered 


on 3/3/19at 09:12; Admin Dose 18 UNIT;  Start 3/3/19 at 08:00


Insulin Glargine (Lantus) 36 units DAILY SC  Last administered on 3/3/19at 


08:54; Admin Dose 36 UNITS;  Start 3/3/19 at 09:00











EDISON MALDONADO MD            Mar 3, 2019 09:28

## 2019-03-03 NOTE — CONS
Assessment/Plan


Assessment/Plan


Hospital Course (Demo Recall)


Patient is alert, feels better still with bilateral flank pain, no hematuria, no


burning with urination, no fevers overnight





WBC 12 H&H 11.5 and 34.6 platelets 147 neutrophils 83.6 BUN 20 creatinine 0.46





Microbiology: Blood culture growing gram-negative rods, urine culture grew 


Klebsiella pneumonia intermittency sensitive to nitrofurantoin





Chest x-ray on admission revealed right lower lobe infiltrate





Antimicrobials: Cefepime





Physical examination: Well-developed well-nourished elderly  woman who 


is alert in no distress.  Head atraumatic normocephalic neck is supple chest 


rise symmetrical breath sounds clear diminished bases.  Heart: S1-S2.  Abdomen 


soft bowel sounds present.  





Assessment:


1.  Sepsis, present on admission


2.  Klebsiella pneumonia UTI 


3.  Bacteremia, likely secondary to above


4.  Pneumonia





Plan: Patient remains stable, we will will add levofloxacin to the regimen, 


continue cefepime until we get final cultures, follow chest x-ray in a.m. and 


repeat blood cultures





Consultation Date/Type/Reason


Admit Date/Time


Mar 1, 2019 at 18:47


Initial Consult Date


3/3/19


Type of Consult


id


Requesting Provider:  KIZZY TILLMAN


Date/Time of Note


DATE: 3/3/19 


TIME: 16:10





Exam/Review of Systems


Exam


Vitals





Vital Signs


  Date      Temp  Pulse  Resp  B/P (MAP)   Pulse Ox  O2          O2 Flow    FiO2


Time                                                 Delivery    Rate


    3/3/19  99.0     92    20      124/59        98


     15:39                           (80)


    3/1/19                                           Room Air


     21:00








Intake and Output





3/2/19


3/2/19


3/3/19





1515:00


23:00


07:00





IntakeIntake Total


1900 ml


1450 ml





BalanceBalance


1900 ml


1450 ml














Results


Result Diagram:  


3/2/19 0515                                                                     


          3/2/19 0515





Results 24hrs





Laboratory Tests


    Test
            3/2/19
17:36  3/2/19
20:42  3/3/19
01:42  3/3/19
07:00


    Bedside Glucose        309  H        280  H        252  H        283  H


    Test
            3/3/19
07:25  3/3/19
12:23  
             



    Bedside Glucose        268  H         155








Medications


Medication





Current Medications


Cefepime HCl 50 ml @  100 mls/hr BID IVPB  Last administered on 3/3/19at 08:52; 


Admin Dose 100 MLS/HR;  Start 3/1/19 at 23:00


Acetaminophen (Tylenol Tab) 650 mg Q4H  PRN PO MILD PAIN(1-3)OR ELEVATED TEMP 


Last administered on 3/2/19at 08:43; Admin Dose 650 MG;  Start 3/1/19 at 23:00


Enoxaparin Sodium (Lovenox) 40 mg DAILY SC  Last administered on 3/3/19at 08:54;


Admin Dose 40 MG;  Start 3/2/19 at 09:00


Aspirin (Halfprin) 81 mg DAILY PO  Last administered on 3/3/19at 08:52; Admin 


Dose 81 MG;  Start 3/2/19 at 09:00


Atorvastatin Calcium (Lipitor) 10 mg HS PO  Last administered on 3/2/19at 20:44;


Admin Dose 10 MG;  Start 3/2/19 at 21:00


Cholecalciferol (Vitamin D) 2,000 unit DAILY PO  Last administered on 3/3/19at 


08:52; Admin Dose 2,000 UNIT;  Start 3/2/19 at 09:00


Acetaminophen/ Hydrocodone Bitart (Norco (5/325)) 1 tab Q6H  PRN PO MODERATE 


PAIN LEVEL 4-6;  Start 3/1/19 at 23:00


Ibuprofen (Motrin) 800 mg Q8H  PRN PO PAIN;  Start 3/1/19 at 23:00


Letrozole (Femara) 2.5 mg DAILY PO  Last administered on 3/3/19at 08:55; Admin 


Dose 2.5 MG;  Start 3/2/19 at 09:00


Magnesium Oxide (Mag-Ox 400) 400 mg BID PO  Last administered on 3/3/19at 08:51;


Admin Dose 400 MG;  Start 3/2/19 at 09:00


Miscellaneous Information 1 ea NOTE XX ;  Start 3/1/19 at 23:30


Glucose (Glutose) 15 gm Q15M  PRN PO DECREASED GLUCOSE;  Start 3/1/19 at 23:30


Glucose (Glutose) 22.5 gm Q15M  PRN PO DECREASED GLUCOSE;  Start 3/1/19 at 23:30


Dextrose (D50w Syringe) 25 ml Q15M  PRN IV DECREASED GLUCOSE;  Start 3/1/19 at 


23:30


Dextrose (D50w Syringe) 50 ml Q15M  PRN IV DECREASED GLUCOSE;  Start 3/1/19 at 


23:30


Glucagon (Glucagen) 1 mg Q15M  PRN IM DECREASED GLUCOSE;  Start 3/1/19 at 23:30


Glucose (Glutose) 15 gm Q15M  PRN BUCCAL DECREASED GLUCOSE;  Start 3/1/19 at 


23:30


Diagnostic Test (Pha) (Accu-Chek) 1 ea 02 XX  Last administered on 3/3/19at 


01:50; Admin Dose 1 EA;  Start 3/2/19 at 02:00


Insulin Aspart (Novolog Insulin Pen) NOVOLOG *MILD* ALGORITHM WITH MEALS  


BEDTIME SC  Last administered on 3/3/19at 12:48; Admin Dose 1 UNIT;  Start 


3/2/19 at 08:00


Sodium Chloride 1,000 ml @  75 mls/hr [id number] IV  Last administered on 3/3/19at 


13:36; Admin Dose 75 MLS/HR;  Start 3/2/19 at 00:00


Metformin HCl (Glucophage) 500 mg BID WITH  MEALS PO  Last administered on 


3/3/19at 08:51; Admin Dose 500 MG;  Start 3/2/19 at 18:00


Diagnostic Test (Pha) (Accu-Chek) 1 ea AC MEALS AND  BEDTIME XX  Last 


administered on 3/3/19at 12:25; Admin Dose 1 EA;  Start 3/2/19 at 17:30


Linagliptin (Tradjenta) 5 mg DAILY PO  Last administered on 3/3/19at 08:52; 


Admin Dose 5 MG;  Start 3/3/19 at 09:00


Empaglifozin (Jardiance) 10 mg DAILY@08 PO  Last administered on 3/3/19at 08:51;


Admin Dose 10 MG;  Start 3/3/19 at 08:00


Senna (Senokot) 1 tab BID PO  Last administered on 3/3/19at 08:52; Admin Dose 1 


TAB;  Start 3/3/19 at 09:00


Insulin Aspart (Novolog Insulin Pen) 18 unit WITH  MEALS SC  Last administered 


on 3/3/19at 13:35; Admin Dose 18 UNIT;  Start 3/3/19 at 08:00


Insulin Glargine (Lantus) 36 units DAILY SC  Last administered on 3/3/19at 


08:54; Admin Dose 36 UNITS;  Start 3/3/19 at 09:00


Phenol (Cepastat Lozenge) 1 lozenge Q1H  PRN MT SORE THROAT;  Start 3/3/19 at 


14:30











TYLER CARBAJAL NP             Mar 3, 2019 16:10

## 2019-03-04 VITALS — SYSTOLIC BLOOD PRESSURE: 130 MMHG | RESPIRATION RATE: 16 BRPM | HEART RATE: 95 BPM | DIASTOLIC BLOOD PRESSURE: 64 MMHG

## 2019-03-04 VITALS — SYSTOLIC BLOOD PRESSURE: 119 MMHG | DIASTOLIC BLOOD PRESSURE: 60 MMHG | RESPIRATION RATE: 16 BRPM | HEART RATE: 97 BPM

## 2019-03-04 VITALS — DIASTOLIC BLOOD PRESSURE: 61 MMHG | RESPIRATION RATE: 18 BRPM | HEART RATE: 92 BPM | SYSTOLIC BLOOD PRESSURE: 134 MMHG

## 2019-03-04 VITALS — HEART RATE: 91 BPM | SYSTOLIC BLOOD PRESSURE: 117 MMHG | DIASTOLIC BLOOD PRESSURE: 60 MMHG | RESPIRATION RATE: 18 BRPM

## 2019-03-04 LAB
ADD MAN DIFF?: NO
ANION GAP: 12 (ref 5–13)
BASOPHIL #: 0 10^3/UL (ref 0–0.1)
BASOPHILS %: 0.4 % (ref 0–2)
BLOOD UREA NITROGEN: 14 MG/DL (ref 7–20)
CALCIUM: 8.8 MG/DL (ref 8.4–10.2)
CARBON DIOXIDE: 23 MMOL/L (ref 21–31)
CHLORIDE: 102 MMOL/L (ref 97–110)
CREATININE: 0.45 MG/DL (ref 0.44–1)
EOSINOPHILS #: 0.1 10^3/UL (ref 0–0.5)
EOSINOPHILS %: 0.5 % (ref 0–7)
GLUCOSE: 126 MG/DL (ref 70–220)
HEMATOCRIT: 33.5 % (ref 37–47)
HEMOGLOBIN: 11.6 G/DL (ref 12–16)
IMMATURE GRANS #M: 0.08 10^3/UL (ref 0–0.03)
IMMATURE GRANS % (M): 0.9 % (ref 0–0.43)
LYMPHOCYTES #: 1.3 10^3/UL (ref 0.8–2.9)
LYMPHOCYTES %: 13.6 % (ref 15–51)
MEAN CORPUSCULAR HEMOGLOBIN: 29.1 PG (ref 29–33)
MEAN CORPUSCULAR HGB CONC: 34.6 G/DL (ref 32–37)
MEAN CORPUSCULAR VOLUME: 84 FL (ref 82–101)
MEAN PLATELET VOLUME: 11.5 FL (ref 7.4–10.4)
MONOCYTE #: 1 10^3/UL (ref 0.3–0.9)
MONOCYTES %: 10.8 % (ref 0–11)
NEUTROPHIL #: 6.8 10^3/UL (ref 1.6–7.5)
NEUTROPHILS %: 73.8 % (ref 39–77)
NUCLEATED RED BLOOD CELLS #: 0 10^3/UL (ref 0–0)
NUCLEATED RED BLOOD CELLS%: 0 /100WBC (ref 0–0)
PLATELET COUNT: 151 10^3/UL (ref 140–415)
POTASSIUM: 4.1 MMOL/L (ref 3.5–5.1)
RED BLOOD COUNT: 3.99 10^6/UL (ref 4.2–5.4)
RED CELL DISTRIBUTION WIDTH: 12.8 % (ref 11.5–14.5)
SODIUM: 137 MMOL/L (ref 135–144)
WHITE BLOOD COUNT: 9.2 10^3/UL (ref 4.8–10.8)

## 2019-03-04 RX ADMIN — ATORVASTATIN CALCIUM 1 MG: 10 TABLET, FILM COATED ORAL at 20:50

## 2019-03-04 RX ADMIN — FOLIC ACID SCH MLS/HR: 5 INJECTION, SOLUTION INTRAMUSCULAR; INTRAVENOUS; SUBCUTANEOUS at 05:20

## 2019-03-04 RX ADMIN — EMPAGLIFLOZIN 1 MG: 10 TABLET, FILM COATED ORAL at 08:47

## 2019-03-04 RX ADMIN — ASPIRIN SCH MG: 81 TABLET, COATED ORAL at 08:48

## 2019-03-04 RX ADMIN — Medication 1 UNIT: at 08:47

## 2019-03-04 RX ADMIN — INSULIN ASPART 1 UNIT: 100 INJECTION, SOLUTION INTRAVENOUS; SUBCUTANEOUS at 20:51

## 2019-03-04 RX ADMIN — LEVOFLOXACIN 1 MG: 500 TABLET, FILM COATED ORAL at 05:18

## 2019-03-04 RX ADMIN — ASPIRIN 1 MG: 81 TABLET, COATED ORAL at 08:48

## 2019-03-04 RX ADMIN — SENNOSIDES SCH TAB: 8.6 TABLET, FILM COATED ORAL at 20:50

## 2019-03-04 RX ADMIN — LINAGLIPTIN 1 MG: 5 TABLET, FILM COATED ORAL at 08:47

## 2019-03-04 RX ADMIN — FOLIC ACID SCH MLS/HR: 5 INJECTION, SOLUTION INTRAMUSCULAR; INTRAVENOUS; SUBCUTANEOUS at 02:07

## 2019-03-04 RX ADMIN — MAGNESIUM OXIDE TAB 400 MG (241.3 MG ELEMENTAL MG) SCH MG: 400 (241.3 MG) TAB at 20:50

## 2019-03-04 RX ADMIN — INSULIN ASPART 1 UNIT: 100 INJECTION, SOLUTION INTRAVENOUS; SUBCUTANEOUS at 17:52

## 2019-03-04 RX ADMIN — FOLIC ACID SCH MLS/HR: 5 INJECTION, SOLUTION INTRAMUSCULAR; INTRAVENOUS; SUBCUTANEOUS at 18:00

## 2019-03-04 RX ADMIN — MAGNESIUM OXIDE TAB 400 MG (241.3 MG ELEMENTAL MG) SCH MG: 400 (241.3 MG) TAB at 08:48

## 2019-03-04 RX ADMIN — ACETAMINOPHEN 1 MG: 325 TABLET, FILM COATED ORAL at 17:59

## 2019-03-04 RX ADMIN — CEFEPIME 1 MLS/HR: 1 INJECTION, POWDER, FOR SOLUTION INTRAMUSCULAR; INTRAVENOUS at 09:36

## 2019-03-04 RX ADMIN — LETROZOLE 1 MG: 2.5 TABLET, FILM COATED ORAL at 08:48

## 2019-03-04 RX ADMIN — ATORVASTATIN CALCIUM SCH MG: 10 TABLET, FILM COATED ORAL at 20:50

## 2019-03-04 RX ADMIN — LINAGLIPTIN SCH MG: 5 TABLET, FILM COATED ORAL at 08:47

## 2019-03-04 RX ADMIN — INSULIN ASPART 1 UNIT: 100 INJECTION, SOLUTION INTRAVENOUS; SUBCUTANEOUS at 11:40

## 2019-03-04 RX ADMIN — SENNOSIDES 1 TAB: 8.6 TABLET, FILM COATED ORAL at 20:50

## 2019-03-04 RX ADMIN — INSULIN GLARGINE 1 UNITS: 100 INJECTION, SOLUTION SUBCUTANEOUS at 08:50

## 2019-03-04 RX ADMIN — INSULIN ASPART 1 UNIT: 100 INJECTION, SOLUTION INTRAVENOUS; SUBCUTANEOUS at 08:49

## 2019-03-04 RX ADMIN — THIAMINE HYDROCHLORIDE 1 MLS/HR: 100 INJECTION, SOLUTION INTRAMUSCULAR; INTRAVENOUS at 05:20

## 2019-03-04 RX ADMIN — THIAMINE HYDROCHLORIDE 1 MLS/HR: 100 INJECTION, SOLUTION INTRAMUSCULAR; INTRAVENOUS at 18:00

## 2019-03-04 RX ADMIN — MAGNESIUM OXIDE TAB 400 MG (241.3 MG ELEMENTAL MG) 1 MG: 400 (241.3 MG) TAB at 08:48

## 2019-03-04 RX ADMIN — INSULIN GLARGINE SCH UNITS: 100 INJECTION, SOLUTION SUBCUTANEOUS at 08:50

## 2019-03-04 RX ADMIN — SENNOSIDES SCH TAB: 8.6 TABLET, FILM COATED ORAL at 08:47

## 2019-03-04 RX ADMIN — EMPAGLIFLOZIN SCH MG: 10 TABLET, FILM COATED ORAL at 08:47

## 2019-03-04 RX ADMIN — MAGNESIUM OXIDE TAB 400 MG (241.3 MG ELEMENTAL MG) 1 MG: 400 (241.3 MG) TAB at 20:50

## 2019-03-04 RX ADMIN — ENOXAPARIN SODIUM 1 MG: 100 INJECTION SUBCUTANEOUS at 08:50

## 2019-03-04 RX ADMIN — DOCUSATE SODIUM 1 MG: 100 CAPSULE, LIQUID FILLED ORAL at 20:50

## 2019-03-04 RX ADMIN — LETROZOLE SCH MG: 2.5 TABLET, FILM COATED ORAL at 08:48

## 2019-03-04 RX ADMIN — LEVOFLOXACIN SCH MG: 500 TABLET, FILM COATED ORAL at 05:18

## 2019-03-04 RX ADMIN — SENNOSIDES 1 TAB: 8.6 TABLET, FILM COATED ORAL at 08:47

## 2019-03-04 RX ADMIN — INSULIN ASPART 1 UNIT: 100 INJECTION, SOLUTION INTRAVENOUS; SUBCUTANEOUS at 12:25

## 2019-03-04 RX ADMIN — CEFEPIME SCH MLS/HR: 1 INJECTION, POWDER, FOR SOLUTION INTRAMUSCULAR; INTRAVENOUS at 09:36

## 2019-03-04 RX ADMIN — INSULIN ASPART 1 UNIT: 100 INJECTION, SOLUTION INTRAVENOUS; SUBCUTANEOUS at 07:50

## 2019-03-04 RX ADMIN — THIAMINE HYDROCHLORIDE 1 MLS/HR: 100 INJECTION, SOLUTION INTRAMUSCULAR; INTRAVENOUS at 02:07

## 2019-03-04 RX ADMIN — ENOXAPARIN SODIUM SCH MG: 100 INJECTION SUBCUTANEOUS at 08:50

## 2019-03-04 RX ADMIN — DOCUSATE SODIUM SCH MG: 100 CAPSULE, LIQUID FILLED ORAL at 20:50

## 2019-03-04 RX ADMIN — INSULIN ASPART 1 UNIT: 100 INJECTION, SOLUTION INTRAVENOUS; SUBCUTANEOUS at 17:54

## 2019-03-04 NOTE — CONS
Assessment/Plan


Assessment/Plan


Problems:  


(1) Type 2 diabetes mellitus with hyperglycemia


Status:  Chronic


Comment:  Excellent glycemic control since yesterday midday.  All values in goal


range.  Cont. current dose of lantus and Novolog along w/ metformin, tradjenta, 


and jardiance.  On d/c pt. should cont. tradjenta and jardiance.  Can resume her


70/30 insulin she was taking at home but total daily dose should be 90 units: 55


1 hour before breakfast and 35 1 hour before dinner.  Should achieve similar 


results to what we are seeing here in house.


Qualifiers:  


   Diabetes mellitus long term insulin use:  with long term use  Qualified 


Codes:  E11.65 - Type 2 diabetes mellitus with hyperglycemia; Z79.4 - Long term 


(current) use of insulin





Consultation Date/Type/Reason


Admit Date/Time


Mar 1, 2019 at 18:47


Initial Consult Date


3/3/19


Type of Consult


Endocrinology


Reason for Consultation


T2DM OOC


Requesting Provider:  KIZZY TILLMAN


Date/Time of Note


DATE: 3/4/19 


TIME: 13:37





24 HR Interval Summary


Constitutional:  no complaints, improved





Detailed Summary


Respiratory:  no complaints


Cardiovascular:  no complaints


Gastrointestinal:  no complaints


Genitourinary:  no complaints


Musculoskeletal:  no complaints


Neurologic:  no complaints





Exam/Review of Systems


Exam


Vitals





                          VS - Last 72 Hours, by Label


  Date      Temp   Pulse  Resp  B/P (MAP)   Pulse Ox  O2         O2 Flow    FiO2


Time                                                  Delivery   Rate


    3/4/19   98.0     92    18      134/61       100  Room Air


     07:24                            (85)


    3/4/19   98.4     95    16      130/64        99  Room Air


     01:36                            (86)


    3/3/19   98.5     99    16      132/60        97  Room Air


     19:32                            (84)


    3/3/19   98.1     89    18      119/60        97  Room Air


     18:00                            (79)


    3/3/19   99.0     92    20      124/59        98


     15:39                            (80)


    3/3/19            96


     12:36


    3/3/19   98.6     95    20      114/55        99


     11:38                            (74)


    3/3/19            88


     08:17


    3/3/19   98.4     91    20      114/56        95


     07:18                            (75)


    3/3/19   98.6     98    17      120/56        97


     04:17                            (77)


    3/3/19           100


     04:00


    3/3/19   98.4     95    17      120/59        98


     00:30                            (79)


    3/3/19           111


     00:00


    3/2/19           105


     20:00


    3/2/19   98.8    105    17      118/56        98


     19:55                            (76)


    3/2/19           112


     16:16


    3/2/19   98.7    104    20      103/71        97


     15:34                            (82)


    3/2/19           104


     12:07


    3/2/19  100.2


     11:26


    3/2/19   98.5    107    18      105/52        95


     11:21                            (69)


    3/2/19  100.2               95/53 (67)


     10:39


    3/2/19  102.2


     08:43


    3/2/19           120


     08:31


    3/2/19  102.2    120    18      123/60        94


     08:16                            (81)


    3/2/19   98.5    105    18      105/55        97


     04:04                            (72)


    3/2/19           103


     04:00


    3/2/19            98


     00:00


    3/1/19   98.0    100    18  99/52 (68)        96


     23:44


    3/1/19           108


     21:27


    3/1/19   98.2    108    18  92/55 (67)        93  Room Air


     21:00


    3/1/19           110    20      102/57        97


     20:37                            (72)


    3/1/19  100.5    120    30  94/56 (69)        95  Room Air


     18:45


    3/1/19  102.3    129    24      119/60        96  Room Air


     17:46                            (79)


    3/1/19  102.3


     17:44


    3/1/19  100.3    127    24      134/64        95  Room Air


     16:07                            (87)


    3/1/19  101.2    131    20      141/67        96


     15:26                            (91)





Vital Signs


  Date      Temp  Pulse  Resp  B/P (MAP)   Pulse Ox  O2          O2 Flow    FiO2


Time                                                 Delivery    Rate


    3/4/19  98.0     92    18      134/61       100  Room Air


     07:24                           (85)








Intake and Output





3/3/19


3/3/19


3/4/19





1515:00


23:00


07:00





IntakeIntake Total


1690 ml


1275 ml





BalanceBalance


1690 ml


1275 ml











Constitutional:  alert, oriented, well developed


Psych:  no complaints, nl mood/affect


Respiratory:  clear to auscultation, normal air movement


Cardiovascular:  regular rate and rhythm, nl pulses; 


   No edema, No murmurs/extra sounds, No rub


Gastrointestinal:  soft, nl liver, spleen, non-tender, bowel sounds; 


   No mass, No rebound or guarding


Musculoskeletal:  nl extremities to inspection


Extremities:  normal pulses; 


   No cyanosis, No clubbing, No edema


Neurological:  CNS II-XII intact, nl mental status, nl speech, nl strength


Additional Comments





Bedside Glucose - 72 Hours


Test
               3/1/19
23:08    3/2/19
01:11    3/2/19
02:05    3/2/19
07:56


Bedside                      504             386             375             367


Glucose
         mg/dL
()   mg/dL
()


                              *H               H               H               H


Test
               3/2/19
11:53    3/2/19
17:36    3/2/19
20:42    3/3/19
01:42


Bedside                      320             309             280             252


Glucose
         mg/dL
()   mg/dL
()


                               H               H               H               H


Test
               3/3/19
07:00    3/3/19
07:25    3/3/19
12:23    3/3/19
17:57


Bedside                      283             268             155             127


Glucose
         mg/dL
()   mg/dL
()


                               H               H


Test
               3/3/19
20:20    3/4/19
08:35    3/4/19
12:24  



Bedside                      120             126             126  



Glucose
          mg/dL
()  mg/dL
()  mg/dL
()








Results


Result Diagram:  


3/4/19 0456                                                                     


          3/4/19 0456





Results 24hrs





Laboratory Tests


Test
                     3/3/19
17:57  3/3/19
20:20  3/4/19
04:56  3/4/19
08:35


Bedside Glucose                  127           120                         126


White Blood Count                                           9.2  #


Red Blood Count                                            3.99  L


Hemoglobin                                                 11.6  L


Hematocrit                                                 33.5  L


Mean Corpuscular Volume                                     84.0


Mean Corpuscular                                            29.1


Hemoglobin


Mean Corpuscular          
             
                  34.6  
  



Hemoglobin
Concent


Red Cell Distribution                                       12.8


Width


Platelet Count                                               151


Mean Platelet Volume                                       11.5  H


Immature Granulocytes %                                   0.900  H


Neutrophils %                                               73.8


Lymphocytes %                                              13.6  L


Monocytes %                                                 10.8


Eosinophils %                                                0.5


Basophils %                                                  0.4


Nucleated Red Blood                                          0.0


Cells %


Immature Granulocytes #                                   0.080  H


Neutrophils #                                                6.8


Lymphocytes #                                                1.3


Monocytes #                                                 1.0  H


Eosinophils #                                                0.1


Basophils #                                                  0.0


Nucleated Red Blood                                          0.0


Cells #


Sodium Level                                                 137


Potassium Level                                              4.1


Chloride Level                                               102


Carbon Dioxide Level                                          23


Anion Gap                                                     12


Blood Urea Nitrogen                                           14


Creatinine                                                  0.45


Est Glomerular Filtrat    
             
             > 60  
       



Rate
mL/min


Glucose Level                                               126  #


Calcium Level                                                8.8


Test
                     3/4/19
12:24  
             
             



Bedside Glucose                  126








Medications


Medication





Current Medications


Cefepime HCl 50 ml @  100 mls/hr BID IVPB  Last administered on 3/4/19at 09:36; 


Admin Dose 100 MLS/HR;  Start 3/1/19 at 23:00


Acetaminophen (Tylenol Tab) 650 mg Q4H  PRN PO MILD PAIN(1-3)OR ELEVATED TEMP 


Last administered on 3/3/19at 18:06; Admin Dose 650 MG;  Start 3/1/19 at 23:00


Enoxaparin Sodium (Lovenox) 40 mg DAILY SC  Last administered on 3/4/19at 08:50;


Admin Dose 40 MG;  Start 3/2/19 at 09:00


Aspirin (Halfprin) 81 mg DAILY PO  Last administered on 3/4/19at 08:48; Admin 


Dose 81 MG;  Start 3/2/19 at 09:00


Atorvastatin Calcium (Lipitor) 10 mg HS PO  Last administered on 3/3/19at 20:23;


Admin Dose 10 MG;  Start 3/2/19 at 21:00


Cholecalciferol (Vitamin D) 2,000 unit DAILY PO  Last administered on 3/4/19at 


08:47; Admin Dose 2,000 UNIT;  Start 3/2/19 at 09:00


Acetaminophen/ Hydrocodone Bitart (Norco (5/325)) 1 tab Q6H  PRN PO MODERATE 


PAIN LEVEL 4-6;  Start 3/1/19 at 23:00


Ibuprofen (Motrin) 800 mg Q8H  PRN PO PAIN;  Start 3/1/19 at 23:00


Letrozole (Femara) 2.5 mg DAILY PO  Last administered on 3/4/19at 08:48; Admin 


Dose 2.5 MG;  Start 3/2/19 at 09:00


Magnesium Oxide (Mag-Ox 400) 400 mg BID PO  Last administered on 3/4/19at 08:48;


Admin Dose 400 MG;  Start 3/2/19 at 09:00


Miscellaneous Information 1 ea NOTE XX ;  Start 3/1/19 at 23:30


Glucose (Glutose) 15 gm Q15M  PRN PO DECREASED GLUCOSE;  Start 3/1/19 at 23:30


Glucose (Glutose) 22.5 gm Q15M  PRN PO DECREASED GLUCOSE;  Start 3/1/19 at 23:30


Dextrose (D50w Syringe) 25 ml Q15M  PRN IV DECREASED GLUCOSE;  Start 3/1/19 at 


23:30


Dextrose (D50w Syringe) 50 ml Q15M  PRN IV DECREASED GLUCOSE;  Start 3/1/19 at 


23:30


Glucagon (Glucagen) 1 mg Q15M  PRN IM DECREASED GLUCOSE;  Start 3/1/19 at 23:30


Glucose (Glutose) 15 gm Q15M  PRN BUCCAL DECREASED GLUCOSE;  Start 3/1/19 at 


23:30


Diagnostic Test (Pha) (Accu-Chek) 1 ea 02 XX  Last administered on 3/3/19at 


01:50; Admin Dose 1 EA;  Start 3/2/19 at 02:00


Insulin Aspart (Novolog Insulin Pen) NOVOLOG *MILD* ALGORITHM WITH MEALS  B


EDTIME SC  Last administered on 3/3/19at 12:48; Admin Dose 1 UNIT;  Start 3/2/19


at 08:00


Sodium Chloride 1,000 ml @  75 mls/hr [id number] IV  Last administered on 3/4/19at 


02:07; Admin Dose 75 MLS/HR;  Start 3/2/19 at 00:00


Metformin HCl (Glucophage) 500 mg BID WITH  MEALS PO  Last administered on 3/4


/19at 08:48; Admin Dose 500 MG;  Start 3/2/19 at 18:00


Diagnostic Test (Pha) (Accu-Chek) 1 ea AC MEALS AND  BEDTIME XX  Last 


administered on 3/4/19at 12:24; Admin Dose 1 EA;  Start 3/2/19 at 17:30


Linagliptin (Tradjenta) 5 mg DAILY PO  Last administered on 3/4/19at 08:47; 


Admin Dose 5 MG;  Start 3/3/19 at 09:00


Empaglifozin (Jardiance) 10 mg DAILY@08 PO  Last administered on 3/4/19at 08:47;


Admin Dose 10 MG;  Start 3/3/19 at 08:00


Senna (Senokot) 1 tab BID PO  Last administered on 3/4/19at 08:47; Admin Dose 1 


TAB;  Start 3/3/19 at 09:00


Insulin Aspart (Novolog Insulin Pen) 18 unit WITH  MEALS SC  Last administered 


on 3/4/19at 12:25; Admin Dose 18 UNIT;  Start 3/3/19 at 08:00


Insulin Glargine (Lantus) 36 units DAILY SC  Last administered on 3/4/19at 


08:50; Admin Dose 36 UNITS;  Start 3/3/19 at 09:00


Phenol (Cepastat Lozenge) 1 lozenge Q1H  PRN MT SORE THROAT;  Start 3/3/19 at 


14:30


Levofloxacin (Levaquin) 500 mg DAILY@06 PO  Last administered on 3/4/19at 05:18;


Admin Dose 500 MG;  Start 3/3/19 at 16:30











EDISON MALDONADO MD            Mar 4, 2019 13:40

## 2019-03-04 NOTE — PN
Date/Time of Note


Date/Time of Note


DATE: 3/4/19 


TIME: 16:56





Assessment/Plan


VTE Prophylaxis


[place]from [facility] risk:  3


SCD applied [place] [facility]  Yes


Pharmacological prophylaxis:  LMWH





Lines/Catheters


IV Catheter Type (from [facility]):  Peripheral IV





Assessment/Plan


Hospital Course


Patient complains of constipation, however denies any dysuria, denies fever, 


will start patient on bowel regimen


Assessment/Plan


-Sepsis with Klebsiella pneumoniae bacteremia secondary to urinary tract infec


tion.  Continue Levaquin for 2 weeks, follow-up on repeat blood cultures.


-Acute pyelonephritis.  Continue Levaquin. Dr. Dreyer is following from 


infectious disease standpoint.


-Diabetes mellitus type 2.  Continue Jardiance, Tradjenta, Lantus, and insulin. 


Dr. Upton is following in endocrinology consultation.


-History of left breast cancer, status post mastectomy





Further recommendations based on clinical course.  Plan of care discussed with 


Dr. Patel.


Result Diagram:  


3/4/19 0456                                                                     


          3/4/19 0456





Results 24hrs





Laboratory Tests


Test
                     3/3/19
17:57  3/3/19
20:20  3/4/19
04:56  3/4/19
08:35


Bedside Glucose                  127           120                         126


White Blood Count                                           9.2  #


Red Blood Count                                            3.99  L


Hemoglobin                                                 11.6  L


Hematocrit                                                 33.5  L


Mean Corpuscular Volume                                     84.0


Mean Corpuscular                                            29.1


Hemoglobin


Mean Corpuscular          
             
                  34.6  
  



Hemoglobin
Concent


Red Cell Distribution                                       12.8


Width


Platelet Count                                               151


Mean Platelet Volume                                       11.5  H


Immature Granulocytes %                                   0.900  H


Neutrophils %                                               73.8


Lymphocytes %                                              13.6  L


Monocytes %                                                 10.8


Eosinophils %                                                0.5


Basophils %                                                  0.4


Nucleated Red Blood                                          0.0


Cells %


Immature Granulocytes #                                   0.080  H


Neutrophils #                                                6.8


Lymphocytes #                                                1.3


Monocytes #                                                 1.0  H


Eosinophils #                                                0.1


Basophils #                                                  0.0


Nucleated Red Blood                                          0.0


Cells #


Sodium Level                                                 137


Potassium Level                                              4.1


Chloride Level                                               102


Carbon Dioxide Level                                          23


Anion Gap                                                     12


Blood Urea Nitrogen                                           14


Creatinine                                                  0.45


Est Glomerular Filtrat    
             
             > 60  
       



Rate
mL/min


Glucose Level                                               126  #


Calcium Level                                                8.8


Test
                     3/4/19
12:24  
             
             



Bedside Glucose                  126








Exam/Review of Systems


Exam


Vitals





Vital Signs


  Date      Temp  Pulse  Resp  B/P (MAP)   Pulse Ox  O2          O2 Flow    FiO2


Time                                                 Delivery    Rate


    3/4/19  98.0     91    18      117/60       100  Room Air


     14:25                           (79)








Intake and Output





3/3/19


3/3/19


3/4/19





1515:00


23:00


07:00





IntakeIntake Total


1690 ml


1275 ml





BalanceBalance


1690 ml


1275 ml











Constitutional:  alert, oriented


Respiratory:  clear to auscultation


Cardiovascular:  nl pulses


Gastrointestinal:  soft, non-tender


Extremities:  normal pulses


Skin:  other (Status post left mastectomy)





Results


Results 24hrs





Laboratory Tests


Test
                     3/3/19
17:57  3/3/19
20:20  3/4/19
04:56  3/4/19
08:35


Bedside Glucose                  127           120                         126


White Blood Count                                           9.2  #


Red Blood Count                                            3.99  L


Hemoglobin                                                 11.6  L


Hematocrit                                                 33.5  L


Mean Corpuscular Volume                                     84.0


Mean Corpuscular                                            29.1


Hemoglobin


Mean Corpuscular          
             
                  34.6  
  



Hemoglobin
Concent


Red Cell Distribution                                       12.8


Width


Platelet Count                                               151


Mean Platelet Volume                                       11.5  H


Immature Granulocytes %                                   0.900  H


Neutrophils %                                               73.8


Lymphocytes %                                              13.6  L


Monocytes %                                                 10.8


Eosinophils %                                                0.5


Basophils %                                                  0.4


Nucleated Red Blood                                          0.0


Cells %


Immature Granulocytes #                                   0.080  H


Neutrophils #                                                6.8


Lymphocytes #                                                1.3


Monocytes #                                                 1.0  H


Eosinophils #                                                0.1


Basophils #                                                  0.0


Nucleated Red Blood                                          0.0


Cells #


Sodium Level                                                 137


Potassium Level                                              4.1


Chloride Level                                               102


Carbon Dioxide Level                                          23


Anion Gap                                                     12


Blood Urea Nitrogen                                           14


Creatinine                                                  0.45


Est Glomerular Filtrat    
             
             > 60  
       



Rate
mL/min


Glucose Level                                               126  #


Calcium Level                                                8.8


Test
                     3/4/19
12:24  
             
             



Bedside Glucose                  126








Medications


Medication





Current Medications


Acetaminophen (Tylenol Tab) 650 mg Q4H  PRN PO MILD PAIN(1-3)OR ELEVATED TEMP 


Last administered on 3/3/19at 18:06; Admin Dose 650 MG;  Start 3/1/19 at 23:00


Enoxaparin Sodium (Lovenox) 40 mg DAILY SC  Last administered on 3/4/19at 08:50;


Admin Dose 40 MG;  Start 3/2/19 at 09:00


Aspirin (Halfprin) 81 mg DAILY PO  Last administered on 3/4/19at 08:48; Admin 


Dose 81 MG;  Start 3/2/19 at 09:00


Atorvastatin Calcium (Lipitor) 10 mg HS PO  Last administered on 3/3/19at 20:23;


Admin Dose 10 MG;  Start 3/2/19 at 21:00


Cholecalciferol (Vitamin D) 2,000 unit DAILY PO  Last administered on 3/4/19at 


08:47; Admin Dose 2,000 UNIT;  Start 3/2/19 at 09:00


Acetaminophen/ Hydrocodone Bitart (Norco (5/325)) 1 tab Q6H  PRN PO MODERATE 


PAIN LEVEL 4-6;  Start 3/1/19 at 23:00


Ibuprofen (Motrin) 800 mg Q8H  PRN PO PAIN;  Start 3/1/19 at 23:00


Letrozole (Femara) 2.5 mg DAILY PO  Last administered on 3/4/19at 08:48; Admin 


Dose 2.5 MG;  Start 3/2/19 at 09:00


Magnesium Oxide (Mag-Ox 400) 400 mg BID PO  Last administered on 3/4/19at 08:48;


Admin Dose 400 MG;  Start 3/2/19 at 09:00


Miscellaneous Information 1 ea NOTE XX ;  Start 3/1/19 at 23:30


Glucose (Glutose) 15 gm Q15M  PRN PO DECREASED GLUCOSE;  Start 3/1/19 at 23:30


Glucose (Glutose) 22.5 gm Q15M  PRN PO DECREASED GLUCOSE;  Start 3/1/19 at 23:30


Dextrose (D50w Syringe) 25 ml Q15M  PRN IV DECREASED GLUCOSE;  Start 3/1/19 at 


23:30


Dextrose (D50w Syringe) 50 ml Q15M  PRN IV DECREASED GLUCOSE;  Start 3/1/19 at 


23:30


Glucagon (Glucagen) 1 mg Q15M  PRN IM DECREASED GLUCOSE;  Start 3/1/19 at 23:30


Glucose (Glutose) 15 gm Q15M  PRN BUCCAL DECREASED GLUCOSE;  Start 3/1/19 at 


23:30


Diagnostic Test (Pha) (Accu-Chek) 1 ea 02 XX  Last administered on 3/3/19at 


01:50; Admin Dose 1 EA;  Start 3/2/19 at 02:00


Insulin Aspart (Novolog Insulin Pen) NOVOLOG *MILD* ALGORITHM WITH MEALS  


BEDTIME SC  Last administered on 3/3/19at 12:48; Admin Dose 1 UNIT;  Start 


3/2/19 at 08:00


Sodium Chloride 1,000 ml @  75 mls/hr [id number] IV  Last administered on 3/4/19at 


02:07; Admin Dose 75 MLS/HR;  Start 3/2/19 at 00:00


Metformin HCl (Glucophage) 500 mg BID WITH  MEALS PO  Last administered on 


3/4/19at 08:48; Admin Dose 500 MG;  Start 3/2/19 at 18:00


Diagnostic Test (Pha) (Accu-Chek) 1 ea AC MEALS AND  BEDTIME XX  Last ad


ministered on 3/4/19at 12:24; Admin Dose 1 EA;  Start 3/2/19 at 17:30


Linagliptin (Tradjenta) 5 mg DAILY PO  Last administered on 3/4/19at 08:47; 


Admin Dose 5 MG;  Start 3/3/19 at 09:00


Empaglifozin (Jardiance) 10 mg DAILY@08 PO  Last administered on 3/4/19at 08:47;


Admin Dose 10 MG;  Start 3/3/19 at 08:00


Senna (Senokot) 1 tab BID PO  Last administered on 3/4/19at 08:47; Admin Dose 1 


TAB;  Start 3/3/19 at 09:00


Insulin Aspart (Novolog Insulin Pen) 18 unit WITH  MEALS SC  Last administered 


on 3/4/19at 12:25; Admin Dose 18 UNIT;  Start 3/3/19 at 08:00


Insulin Glargine (Lantus) 36 units DAILY SC  Last administered on 3/4/19at 


08:50; Admin Dose 36 UNITS;  Start 3/3/19 at 09:00


Phenol (Cepastat Lozenge) 1 lozenge Q1H  PRN MT SORE THROAT;  Start 3/3/19 at 


14:30


Levofloxacin (Levaquin) 500 mg DAILY@06 PO  Last administered on 3/4/19at 05:18;


Admin Dose 500 MG;  Start 3/3/19 at 16:30











REGINA RAZO              Mar 4, 2019 17:03

## 2019-03-04 NOTE — CONS
Assessment/Plan


Assessment/Plan


Hospital Course (Demo Recall)


Patient is alert, feels good, no fevers over night





Microbiology: Blood culture and urine culture grew Klebsiella pneumonia 





Chest x-ray on admission revealed right lower lobe infiltrate





Antimicrobials: Cefepime, Levaquin





Physical examination: Well-developed well-nourished elderly  woman who 


is alert in no distress.  Head atraumatic normocephalic neck is supple chest 


rise symmetrical breath sounds clear diminished bases.  Heart: S1-S2.  Abdomen 


soft bowel sounds present.  





Assessment:


1.  Sepsis, present on admission


2.  Klebsiella pneumonia UTI 


3.  Bacteremia, likely secondary to above


4.  CAP





Plan: Patient is doing much better, pending repeat blood cultures, we will dis


continue cefepime, continue oral Levaquin for 2 more weeks, okay discharge home 


on current antibiotics





Consultation Date/Type/Reason


Admit Date/Time


Mar 1, 2019 at 18:47


Initial Consult Date


3/3/19


Type of Consult


id


Requesting Provider:  KIZZY TILLMAN


Date/Time of Note


DATE: 3/4/19 


TIME: 14:14





Exam/Review of Systems


Exam


Vitals





Vital Signs


  Date      Temp  Pulse  Resp  B/P (MAP)   Pulse Ox  O2          O2 Flow    FiO2


Time                                                 Delivery    Rate


    3/4/19  98.0     92    18      134/61       100  Room Air


     07:24                           (85)








Intake and Output





3/3/19


3/3/19


3/4/19





1515:00


23:00


07:00





IntakeIntake Total


1690 ml


1275 ml





BalanceBalance


1690 ml


1275 ml














Results


Result Diagram:  


3/4/19 0456                                                                     


          3/4/19 0456





Results 24hrs





Laboratory Tests


Test
                     3/3/19
17:57  3/3/19
20:20  3/4/19
04:56  3/4/19
08:35


Bedside Glucose                  127           120                         126


White Blood Count                                           9.2  #


Red Blood Count                                            3.99  L


Hemoglobin                                                 11.6  L


Hematocrit                                                 33.5  L


Mean Corpuscular Volume                                     84.0


Mean Corpuscular                                            29.1


Hemoglobin


Mean Corpuscular          
             
                  34.6  
  



Hemoglobin
Concent


Red Cell Distribution                                       12.8


Width


Platelet Count                                               151


Mean Platelet Volume                                       11.5  H


Immature Granulocytes %                                   0.900  H


Neutrophils %                                               73.8


Lymphocytes %                                              13.6  L


Monocytes %                                                 10.8


Eosinophils %                                                0.5


Basophils %                                                  0.4


Nucleated Red Blood                                          0.0


Cells %


Immature Granulocytes #                                   0.080  H


Neutrophils #                                                6.8


Lymphocytes #                                                1.3


Monocytes #                                                 1.0  H


Eosinophils #                                                0.1


Basophils #                                                  0.0


Nucleated Red Blood                                          0.0


Cells #


Sodium Level                                                 137


Potassium Level                                              4.1


Chloride Level                                               102


Carbon Dioxide Level                                          23


Anion Gap                                                     12


Blood Urea Nitrogen                                           14


Creatinine                                                  0.45


Est Glomerular Filtrat    
             
             > 60  
       



Rate
mL/min


Glucose Level                                               126  #


Calcium Level                                                8.8


Test
                     3/4/19
12:24  
             
             



Bedside Glucose                  126








Medications


Medication





Current Medications


Cefepime HCl 50 ml @  100 mls/hr BID IVPB  Last administered on 3/4/19at 09:36; 


Admin Dose 100 MLS/HR;  Start 3/1/19 at 23:00


Acetaminophen (Tylenol Tab) 650 mg Q4H  PRN PO MILD PAIN(1-3)OR ELEVATED TEMP 


Last administered on 3/3/19at 18:06; Admin Dose 650 MG;  Start 3/1/19 at 23:00


Enoxaparin Sodium (Lovenox) 40 mg DAILY SC  Last administered on 3/4/19at 08:50;


Admin Dose 40 MG;  Start 3/2/19 at 09:00


Aspirin (Halfprin) 81 mg DAILY PO  Last administered on 3/4/19at 08:48; Admin 


Dose 81 MG;  Start 3/2/19 at 09:00


Atorvastatin Calcium (Lipitor) 10 mg HS PO  Last administered on 3/3/19at 20:23;


Admin Dose 10 MG;  Start 3/2/19 at 21:00


Cholecalciferol (Vitamin D) 2,000 unit DAILY PO  Last administered on 3/4/19at 


08:47; Admin Dose 2,000 UNIT;  Start 3/2/19 at 09:00


Acetaminophen/ Hydrocodone Bitart (Norco (5/325)) 1 tab Q6H  PRN PO MODERATE 


PAIN LEVEL 4-6;  Start 3/1/19 at 23:00


Ibuprofen (Motrin) 800 mg Q8H  PRN PO PAIN;  Start 3/1/19 at 23:00


Letrozole (Femara) 2.5 mg DAILY PO  Last administered on 3/4/19at 08:48; Admin 


Dose 2.5 MG;  Start 3/2/19 at 09:00


Magnesium Oxide (Mag-Ox 400) 400 mg BID PO  Last administered on 3/4/19at 08:48;


Admin Dose 400 MG;  Start 3/2/19 at 09:00


Miscellaneous Information 1 ea NOTE XX ;  Start 3/1/19 at 23:30


Glucose (Glutose) 15 gm Q15M  PRN PO DECREASED GLUCOSE;  Start 3/1/19 at 23:30


Glucose (Glutose) 22.5 gm Q15M  PRN PO DECREASED GLUCOSE;  Start 3/1/19 at 23:30


Dextrose (D50w Syringe) 25 ml Q15M  PRN IV DECREASED GLUCOSE;  Start 3/1/19 at 


23:30


Dextrose (D50w Syringe) 50 ml Q15M  PRN IV DECREASED GLUCOSE;  Start 3/1/19 at 


23:30


Glucagon (Glucagen) 1 mg Q15M  PRN IM DECREASED GLUCOSE;  Start 3/1/19 at 23:30


Glucose (Glutose) 15 gm Q15M  PRN BUCCAL DECREASED GLUCOSE;  Start 3/1/19 at 


23:30


Diagnostic Test (Pha) (Accu-Chek) 1 ea 02 XX  Last administered on 3/3/19at 


01:50; Admin Dose 1 EA;  Start 3/2/19 at 02:00


Insulin Aspart (Novolog Insulin Pen) NOVOLOG *MILD* ALGORITHM WITH MEALS  


BEDTIME SC  Last administered on 3/3/19at 12:48; Admin Dose 1 UNIT;  Start 


3/2/19 at 08:00


Sodium Chloride 1,000 ml @  75 mls/hr [id number] IV  Last administered on 3/4/19at 


02:07; Admin Dose 75 MLS/HR;  Start 3/2/19 at 00:00


Metformin HCl (Glucophage) 500 mg BID WITH  MEALS PO  Last administered on 


3/4/19at 08:48; Admin Dose 500 MG;  Start 3/2/19 at 18:00


Diagnostic Test (Pha) (Accu-Chek) 1 ea AC MEALS AND  BEDTIME XX  Last 


administered on 3/4/19at 12:24; Admin Dose 1 EA;  Start 3/2/19 at 17:30


Linagliptin (Tradjenta) 5 mg DAILY PO  Last administered on 3/4/19at 08:47; 


Admin Dose 5 MG;  Start 3/3/19 at 09:00


Empaglifozin (Jardiance) 10 mg DAILY@08 PO  Last administered on 3/4/19at 08:47;


Admin Dose 10 MG;  Start 3/3/19 at 08:00


Senna (Senokot) 1 tab BID PO  Last administered on 3/4/19at 08:47; Admin Dose 1 


TAB;  Start 3/3/19 at 09:00


Insulin Aspart (Novolog Insulin Pen) 18 unit WITH  MEALS SC  Last administered 


on 3/4/19at 12:25; Admin Dose 18 UNIT;  Start 3/3/19 at 08:00


Insulin Glargine (Lantus) 36 units DAILY SC  Last administered on 3/4/19at 


08:50; Admin Dose 36 UNITS;  Start 3/3/19 at 09:00


Phenol (Cepastat Lozenge) 1 lozenge Q1H  PRN MT SORE THROAT;  Start 3/3/19 at 


14:30


Levofloxacin (Levaquin) 500 mg DAILY@06 PO  Last administered on 3/4/19at 05:18;


Admin Dose 500 MG;  Start 3/3/19 at 16:30











TYLER CARBAJAL NP             Mar 4, 2019 14:16

## 2019-03-04 NOTE — PN
DATE:  03/02/2019

 

 

SUBJECTIVE:  Follow up on pyelonephritis, diabetes, breast cancer. The patient is feeling better.  De
nies any chest pain or shortness of breath.  No flank pain, no reported temperature spike since this 
morning.  The patient is awake, alert.

 

PHYSICAL EXAMINATION:

VITAL SIGNS:  Temperature 98.4, pulse 88, respirations 20, blood pressure 114/56, O2 saturation 95 on
 room air.

HEENT:  No eye discharge or redness.  Conjunctivae normal.

NECK:  Supple, no mass.

CHEST:  Fairly clear.

CARDIOVASCULAR:  S1, S2 normal, no murmur.

ABDOMEN:  Soft and nontender.

NEUROLOGIC:  The patient is awake, alert, fairly oriented with no gross focal deficit.

 

LABORATORY DATA:  Blood culture is growing gram-negative rods.  Final _____ pending.  Urine culture i
s growing Klebsiella pneumoniae sensitive to IV 3rd generation cephalosporin and _____.  Glucose read
ing still above 250s.

 

IMPRESSION:

1.  Acute pyelonephritis.  Will continue IV cefepime.  The patient is being seen by Dr. Dreyer from i
nfectious disease standpoint _____.

2.  The patient was seen by Dr. Upton and has been started on Tradjenta, Jardiance, and will be con
tinued on Lantus and premeal insulin.  

3.  Will do followup labs.

 

 

Dictated By: BRYAN ARTHUR MD

 

AB/NTS

DD:    03/03/2019 11:24:54

DT:    03/03/2019 12:39:27

Conf#: [id number]

DID#:  [id number]

CC: BRYAN ARTHUR MD;*EndCC*

## 2019-03-05 VITALS — DIASTOLIC BLOOD PRESSURE: 55 MMHG | RESPIRATION RATE: 18 BRPM | HEART RATE: 91 BPM | SYSTOLIC BLOOD PRESSURE: 116 MMHG

## 2019-03-05 VITALS — SYSTOLIC BLOOD PRESSURE: 126 MMHG | DIASTOLIC BLOOD PRESSURE: 68 MMHG | RESPIRATION RATE: 16 BRPM | HEART RATE: 86 BPM

## 2019-03-05 VITALS — DIASTOLIC BLOOD PRESSURE: 62 MMHG | HEART RATE: 89 BPM | RESPIRATION RATE: 18 BRPM | SYSTOLIC BLOOD PRESSURE: 135 MMHG

## 2019-03-05 LAB
ADD MAN DIFF?: NO
ANION GAP: 12 (ref 5–13)
BASOPHIL #: 0 10^3/UL (ref 0–0.1)
BASOPHILS %: 0.3 % (ref 0–2)
BLOOD UREA NITROGEN: 14 MG/DL (ref 7–20)
CALCIUM: 9.2 MG/DL (ref 8.4–10.2)
CARBON DIOXIDE: 24 MMOL/L (ref 21–31)
CHLORIDE: 103 MMOL/L (ref 97–110)
CREATININE: 0.47 MG/DL (ref 0.44–1)
EOSINOPHILS #: 0.1 10^3/UL (ref 0–0.5)
EOSINOPHILS %: 1 % (ref 0–7)
GLUCOSE: 135 MG/DL (ref 70–220)
HEMATOCRIT: 33.1 % (ref 37–47)
HEMOGLOBIN: 11.2 G/DL (ref 12–16)
IMMATURE GRANS #M: 0.09 10^3/UL (ref 0–0.03)
IMMATURE GRANS % (M): 1 % (ref 0–0.43)
LYMPHOCYTES #: 1.5 10^3/UL (ref 0.8–2.9)
LYMPHOCYTES %: 16.3 % (ref 15–51)
MEAN CORPUSCULAR HEMOGLOBIN: 28.6 PG (ref 29–33)
MEAN CORPUSCULAR HGB CONC: 33.8 G/DL (ref 32–37)
MEAN CORPUSCULAR VOLUME: 84.7 FL (ref 82–101)
MEAN PLATELET VOLUME: 10.9 FL (ref 7.4–10.4)
MONOCYTE #: 1 10^3/UL (ref 0.3–0.9)
MONOCYTES %: 11.3 % (ref 0–11)
NEUTROPHIL #: 6.2 10^3/UL (ref 1.6–7.5)
NEUTROPHILS %: 70.1 % (ref 39–77)
NUCLEATED RED BLOOD CELLS #: 0 10^3/UL (ref 0–0)
NUCLEATED RED BLOOD CELLS%: 0 /100WBC (ref 0–0)
PLATELET COUNT: 193 10^3/UL (ref 140–415)
POTASSIUM: 3.8 MMOL/L (ref 3.5–5.1)
RED BLOOD COUNT: 3.91 10^6/UL (ref 4.2–5.4)
RED CELL DISTRIBUTION WIDTH: 13.1 % (ref 11.5–14.5)
SODIUM: 139 MMOL/L (ref 135–144)
WHITE BLOOD COUNT: 8.9 10^3/UL (ref 4.8–10.8)

## 2019-03-05 RX ADMIN — MAGNESIUM OXIDE TAB 400 MG (241.3 MG ELEMENTAL MG) 1 MG: 400 (241.3 MG) TAB at 09:01

## 2019-03-05 RX ADMIN — THIAMINE HYDROCHLORIDE 1 MLS/HR: 100 INJECTION, SOLUTION INTRAMUSCULAR; INTRAVENOUS at 06:50

## 2019-03-05 RX ADMIN — INSULIN GLARGINE SCH UNITS: 100 INJECTION, SOLUTION SUBCUTANEOUS at 09:07

## 2019-03-05 RX ADMIN — EMPAGLIFLOZIN 1 MG: 10 TABLET, FILM COATED ORAL at 09:01

## 2019-03-05 RX ADMIN — INSULIN ASPART 1 UNIT: 100 INJECTION, SOLUTION INTRAVENOUS; SUBCUTANEOUS at 09:05

## 2019-03-05 RX ADMIN — INSULIN ASPART 1 UNIT: 100 INJECTION, SOLUTION INTRAVENOUS; SUBCUTANEOUS at 09:06

## 2019-03-05 RX ADMIN — LINAGLIPTIN 1 MG: 5 TABLET, FILM COATED ORAL at 09:02

## 2019-03-05 RX ADMIN — ENOXAPARIN SODIUM SCH MG: 100 INJECTION SUBCUTANEOUS at 09:07

## 2019-03-05 RX ADMIN — DOCUSATE SODIUM 1 MG: 100 CAPSULE, LIQUID FILLED ORAL at 09:01

## 2019-03-05 RX ADMIN — THIAMINE HYDROCHLORIDE 1 MLS/HR: 100 INJECTION, SOLUTION INTRAMUSCULAR; INTRAVENOUS at 08:00

## 2019-03-05 RX ADMIN — LINAGLIPTIN SCH MG: 5 TABLET, FILM COATED ORAL at 09:02

## 2019-03-05 RX ADMIN — FOLIC ACID SCH MLS/HR: 5 INJECTION, SOLUTION INTRAMUSCULAR; INTRAVENOUS; SUBCUTANEOUS at 08:00

## 2019-03-05 RX ADMIN — ENOXAPARIN SODIUM 1 MG: 100 INJECTION SUBCUTANEOUS at 09:07

## 2019-03-05 RX ADMIN — SENNOSIDES SCH TAB: 8.6 TABLET, FILM COATED ORAL at 09:01

## 2019-03-05 RX ADMIN — INSULIN GLARGINE 1 UNITS: 100 INJECTION, SOLUTION SUBCUTANEOUS at 09:07

## 2019-03-05 RX ADMIN — ASPIRIN SCH MG: 81 TABLET, COATED ORAL at 09:01

## 2019-03-05 RX ADMIN — Medication 1 UNIT: at 09:02

## 2019-03-05 RX ADMIN — LEVOFLOXACIN SCH MG: 500 TABLET, FILM COATED ORAL at 05:00

## 2019-03-05 RX ADMIN — MAGNESIUM OXIDE TAB 400 MG (241.3 MG ELEMENTAL MG) SCH MG: 400 (241.3 MG) TAB at 09:01

## 2019-03-05 RX ADMIN — LETROZOLE 1 MG: 2.5 TABLET, FILM COATED ORAL at 09:04

## 2019-03-05 RX ADMIN — INSULIN ASPART 1 UNIT: 100 INJECTION, SOLUTION INTRAVENOUS; SUBCUTANEOUS at 13:00

## 2019-03-05 RX ADMIN — FOLIC ACID SCH MLS/HR: 5 INJECTION, SOLUTION INTRAMUSCULAR; INTRAVENOUS; SUBCUTANEOUS at 06:50

## 2019-03-05 RX ADMIN — INSULIN ASPART 1 UNIT: 100 INJECTION, SOLUTION INTRAVENOUS; SUBCUTANEOUS at 17:53

## 2019-03-05 RX ADMIN — EMPAGLIFLOZIN SCH MG: 10 TABLET, FILM COATED ORAL at 09:01

## 2019-03-05 RX ADMIN — LEVOFLOXACIN 1 MG: 500 TABLET, FILM COATED ORAL at 05:00

## 2019-03-05 RX ADMIN — ASPIRIN 1 MG: 81 TABLET, COATED ORAL at 09:01

## 2019-03-05 RX ADMIN — LETROZOLE SCH MG: 2.5 TABLET, FILM COATED ORAL at 09:04

## 2019-03-05 RX ADMIN — SENNOSIDES 1 TAB: 8.6 TABLET, FILM COATED ORAL at 09:01

## 2019-03-05 RX ADMIN — INSULIN ASPART 1 UNIT: 100 INJECTION, SOLUTION INTRAVENOUS; SUBCUTANEOUS at 13:11

## 2019-03-05 RX ADMIN — DOCUSATE SODIUM SCH MG: 100 CAPSULE, LIQUID FILLED ORAL at 09:01

## 2019-03-05 NOTE — CONS
Assessment/Plan


Assessment/Plan


Problems:  


(1) Type 2 diabetes mellitus with hyperglycemia


Status:  Chronic


Comment:  Excellent glycemic control continues.  All values w/i goal range.  


Cont. metformin, jardiance, tradjenta, and current doses of insulin.  Will 


follow with you.


Qualifiers:  


   Diabetes mellitus long term insulin use:  with long term use  Qualified 


Codes:  E11.65 - Type 2 diabetes mellitus with hyperglycemia; Z79.4 - Long term 


(current) use of insulin





Consultation Date/Type/Reason


Admit Date/Time


Mar 1, 2019 at 18:47


Initial Consult Date


3/3/19


Type of Consult


Endocrinology


Reason for Consultation


[id number]


Requesting Provider:  KIZZY TILLMAN


Date/Time of Note


DATE: 3/5/19 


TIME: 13:39





24 HR Interval Summary


Constitutional:  no complaints, improved





Detailed Summary


Respiratory:  no complaints


Cardiovascular:  no complaints


Gastrointestinal:  pain (R side)


Genitourinary:  no complaints


Musculoskeletal:  no complaints


Neurologic:  no complaints





Exam/Review of Systems


Exam


Vitals





                          VS - Last 72 Hours, by Label


  Date      Temp  Pulse  Resp  B/P (MAP)   Pulse Ox  O2          O2 Flow    FiO2


Time                                                 Delivery    Rate


    3/5/19  98.0     89    18      135/62        95  Room Air


     08:15                           (86)


    3/5/19  98.0     86    16      126/68        98  Room Air


     01:44                           (87)


    3/4/19  98.6     97    16      119/60        97  Room Air


     19:24                           (79)


    3/4/19  98.0     91    18      117/60       100  Room Air


     14:25                           (79)


    3/4/19  98.0     92    18      134/61       100  Room Air


     07:24                           (85)


    3/4/19  98.4     95    16      130/64        99  Room Air


     01:36                           (86)


    3/3/19  98.5     99    16      132/60        97  Room Air


     19:32                           (84)


    3/3/19  98.1     89    18      119/60        97  Room Air


     18:00                           (79)


    3/3/19  99.0     92    20      124/59        98


     15:39                           (80)


    3/3/19           96


     12:36


    3/3/19  98.6     95    20      114/55        99


     11:38                           (74)


    3/3/19           88


     08:17


    3/3/19  98.4     91    20      114/56        95


     07:18                           (75)


    3/3/19  98.6     98    17      120/56        97


     04:17                           (77)


    3/3/19          100


     04:00


    3/3/19  98.4     95    17      120/59        98


     00:30                           (79)


    3/3/19          111


     00:00


    3/2/19          105


     20:00


    3/2/19  98.8    105    17      118/56        98


     19:55                           (76)


    3/2/19          112


     16:16


    3/2/19  98.7    104    20      103/71        97


     15:34                           (82)





Vital Signs


  Date      Temp  Pulse  Resp  B/P (MAP)   Pulse Ox  O2          O2 Flow    FiO2


Time                                                 Delivery    Rate


    3/5/19  98.0     89    18      135/62        95  Room Air


     08:15                           (86)








Intake and Output





3/4/19


3/4/19


3/5/19





1515:00


23:00


07:00





IntakeIntake Total


350 ml


1625 ml


800 ml





BalanceBalance


350 ml


1625 ml


800 ml











Constitutional:  alert, oriented, well developed


Psych:  no complaints, nl mood/affect


Respiratory:  clear to auscultation, normal air movement


Cardiovascular:  regular rate and rhythm, nl pulses; 


   No edema, No murmurs/extra sounds, No rub


Gastrointestinal:  soft, nl liver, spleen, bowel sounds, tender (R 


hypochondrium); 


   No non-tender, No mass, No rebound or guarding


Musculoskeletal:  nl extremities to inspection


Extremities:  normal pulses; 


   No cyanosis, No clubbing, No edema


Neurological:  CNS II-XII intact, nl mental status, nl speech, nl strength


Additional Comments





Bedside Glucose - 72 Hours


Test
               3/2/19
17:36    3/2/19
20:42    3/3/19
01:42    3/3/19
07:00


Bedside                      309             280             252             283


Glucose
         mg/dL
()   mg/dL
()


                               H               H               H               H


Test
               3/3/19
07:25    3/3/19
12:23    3/3/19
17:57    3/3/19
20:20


Bedside                      268             155             127             120


Glucose
         mg/dL
()   mg/dL
()


                               H


Test
               3/4/19
08:35    3/4/19
12:24    3/4/19
17:49    3/4/19
20:49


Bedside                      126             126             145              94


Glucose
          mg/dL
()  mg/dL
()


Test
               3/5/19
08:58    3/5/19
13:06  
               



Bedside                      167             116  
               



Glucose
          mg/dL
()  mg/dL
()








Results


Result Diagram:  


3/5/19 0441                                                                     


          3/5/19 0441





Results 24hrs





Laboratory Tests


Test
                     3/4/19
17:49  3/4/19
20:49  3/5/19
04:41  3/5/19
08:58


Bedside Glucose                  145            94                         167


White Blood Count                                            8.9


Red Blood Count                                            3.91  L


Hemoglobin                                                 11.2  L


Hematocrit                                                 33.1  L


Mean Corpuscular Volume                                     84.7


Mean Corpuscular                                           28.6  L


Hemoglobin


Mean Corpuscular          
             
                  33.8  
  



Hemoglobin
Concent


Red Cell Distribution                                       13.1


Width


Platelet Count                                              193  #


Mean Platelet Volume                                       10.9  H


Immature Granulocytes %                                   1.000  H


Neutrophils %                                               70.1


Lymphocytes %                                               16.3


Monocytes %                                                11.3  H


Eosinophils %                                                1.0


Basophils %                                                  0.3


Nucleated Red Blood                                          0.0


Cells %


Immature Granulocytes #                                   0.090  H


Neutrophils #                                                6.2


Lymphocytes #                                                1.5


Monocytes #                                                 1.0  H


Eosinophils #                                                0.1


Basophils #                                                  0.0


Nucleated Red Blood                                          0.0


Cells #


Sodium Level                                                 139


Potassium Level                                              3.8


Chloride Level                                               103


Carbon Dioxide Level                                          24


Anion Gap                                                     12


Blood Urea Nitrogen                                           14


Creatinine                                                  0.47


Est Glomerular Filtrat    
             
             > 60  
       



Rate
mL/min


Glucose Level                                                135


Calcium Level                                                9.2


Test
                     3/5/19
13:06  
             
             



Bedside Glucose                  116








Medications


Medication





Current Medications


Acetaminophen (Tylenol Tab) 650 mg Q4H  PRN PO MILD PAIN(1-3)OR ELEVATED TEMP 


Last administered on 3/4/19at 17:59; Admin Dose 650 MG;  Start 3/1/19 at 23:00


Enoxaparin Sodium (Lovenox) 40 mg DAILY SC  Last administered on 3/5/19at 09:07;


Admin Dose 40 MG;  Start 3/2/19 at 09:00


Aspirin (Halfprin) 81 mg DAILY PO  Last administered on 3/5/19at 09:01; Admin 


Dose 81 MG;  Start 3/2/19 at 09:00


Atorvastatin Calcium (Lipitor) 10 mg HS PO  Last administered on 3/4/19at 20:50;


Admin Dose 10 MG;  Start 3/2/19 at 21:00


Cholecalciferol (Vitamin D) 2,000 unit DAILY PO  Last administered on 3/5/19at 


09:02; Admin Dose 2,000 UNIT;  Start 3/2/19 at 09:00


Acetaminophen/ Hydrocodone Bitart (Norco (5/325)) 1 tab Q6H  PRN PO MODERATE 


PAIN LEVEL 4-6;  Start 3/1/19 at 23:00


Ibuprofen (Motrin) 800 mg Q8H  PRN PO PAIN;  Start 3/1/19 at 23:00


Letrozole (Femara) 2.5 mg DAILY PO  Last administered on 3/5/19at 09:04; Admin 


Dose 2.5 MG;  Start 3/2/19 at 09:00


Magnesium Oxide (Mag-Ox 400) 400 mg BID PO  Last administered on 3/5/19at 09:01;


Admin Dose 400 MG;  Start 3/2/19 at 09:00


Miscellaneous Information 1 ea NOTE XX ;  Start 3/1/19 at 23:30


Glucose (Glutose) 15 gm Q15M  PRN PO DECREASED GLUCOSE;  Start 3/1/19 at 23:30


Glucose (Glutose) 22.5 gm Q15M  PRN PO DECREASED GLUCOSE;  Start 3/1/19 at 23:30


Dextrose (D50w Syringe) 25 ml Q15M  PRN IV DECREASED GLUCOSE;  Start 3/1/19 at 


23:30


Dextrose (D50w Syringe) 50 ml Q15M  PRN IV DECREASED GLUCOSE;  Start 3/1/19 at 


23:30


Glucagon (Glucagen) 1 mg Q15M  PRN IM DECREASED GLUCOSE;  Start 3/1/19 at 23:30


Glucose (Glutose) 15 gm Q15M  PRN BUCCAL DECREASED GLUCOSE;  Start 3/1/19 at 


23:30


Diagnostic Test (Pha) (Accu-Chek) 1 ea 02 XX  Last administered on 3/3/19at 


01:50; Admin Dose 1 EA;  Start 3/2/19 at 02:00


Insulin Aspart (Novolog Insulin Pen) NOVOLOG *MILD* ALGORITHM WITH MEALS  


BEDTIME SC  Last administered on 3/5/19at 09:05; Admin Dose 1 UNIT;  Start 


3/2/19 at 08:00


Sodium Chloride 1,000 ml @  75 mls/hr [id number] IV  Last administered on 3/5/19at 


06:50; Admin Dose 75 MLS/HR;  Start 3/2/19 at 00:00


Metformin HCl (Glucophage) 500 mg BID WITH  MEALS PO  Last administered on 


3/5/19at 09:01; Admin Dose 500 MG;  Start 3/2/19 at 18:00


Diagnostic Test (Pha) (Accu-Chek) 1 ea AC MEALS AND  BEDTIME XX  Last administ


ered on 3/5/19at 13:00; Admin Dose 1 EA;  Start 3/2/19 at 17:30


Linagliptin (Tradjenta) 5 mg DAILY PO  Last administered on 3/5/19at 09:02; 


Admin Dose 5 MG;  Start 3/3/19 at 09:00


Empaglifozin (Jardiance) 10 mg DAILY@08 PO  Last administered on 3/5/19at 09:01;


Admin Dose 10 MG;  Start 3/3/19 at 08:00


Senna (Senokot) 1 tab BID PO  Last administered on 3/5/19at 09:01; Admin Dose 1 


TAB;  Start 3/3/19 at 09:00


Insulin Aspart (Novolog Insulin Pen) 18 unit WITH  MEALS SC  Last administered 


on 3/5/19at 13:11; Admin Dose 18 UNIT;  Start 3/3/19 at 08:00


Insulin Glargine (Lantus) 36 units DAILY SC  Last administered on 3/5/19at 


09:07; Admin Dose 36 UNITS;  Start 3/3/19 at 09:00


Phenol (Cepastat Lozenge) 1 lozenge Q1H  PRN MT SORE THROAT;  Start 3/3/19 at 


14:30


Levofloxacin (Levaquin) 500 mg DAILY@06 PO  Last administered on 3/5/19at 05:00;


Admin Dose 500 MG;  Start 3/3/19 at 16:30


Docusate Sodium (Colace) 100 mg BID PO  Last administered on 3/5/19at 09:01; 


Admin Dose 100 MG;  Start 3/4/19 at 21:00











EDISON MALDONADO MD            Mar 5, 2019 13:40

## 2019-03-05 NOTE — CONS
Assessment/Plan


Assessment/Plan


Hospital Course (Demo Recall)


Sleeping, nad, no fevers over night





Microbiology: Blood culture and urine culture grew Klebsiella pneumonia 





Chest x-ray on admission revealed right lower lobe infiltrate





Antimicrobials: Levaquin





Physical examination: Well-developed well-nourished elderly  woman who 


is alert in no distress.  Head atraumatic normocephalic neck is supple chest 


rise symmetrical breath sounds clear diminished bases.  Heart: S1-S2.  Abdomen 


soft bowel sounds present.  





Assessment:


1.  Sepsis, present on admission


2.  Klebsiella pneumonia UTI 


3.  Bacteremia  secondary to above


4.  CAP





Plan: Stable, repeat blood cultures negative, okay to discharge on oral Levaquin


for 2 more weeks





Consultation Date/Type/Reason


Admit Date/Time


Mar 1, 2019 at 18:47


Initial Consult Date


3/3/19


Type of Consult


id


Requesting Provider:  KIZZY TILLMAN


Date/Time of Note


DATE: 3/5/19 


TIME: 12:43





Exam/Review of Systems


Exam


Vitals





Vital Signs


  Date      Temp  Pulse  Resp  B/P (MAP)   Pulse Ox  O2          O2 Flow    FiO2


Time                                                 Delivery    Rate


    3/5/19  98.0     89    18      135/62        95  Room Air


     08:15                           (86)








Intake and Output





3/4/19


3/4/19


3/5/19





1515:00


23:00


07:00





IntakeIntake Total


350 ml


1625 ml


800 ml





BalanceBalance


350 ml


1625 ml


800 ml














Results


Result Diagram:  


3/5/19 0441                                                                     


          3/5/19 0441





Results 24hrs





Laboratory Tests


Test
                     3/4/19
17:49  3/4/19
20:49  3/5/19
04:41  3/5/19
08:58


Bedside Glucose                  145            94                         167


White Blood Count                                            8.9


Red Blood Count                                            3.91  L


Hemoglobin                                                 11.2  L


Hematocrit                                                 33.1  L


Mean Corpuscular Volume                                     84.7


Mean Corpuscular                                           28.6  L


Hemoglobin


Mean Corpuscular          
             
                  33.8  
  



Hemoglobin
Concent


Red Cell Distribution                                       13.1


Width


Platelet Count                                              193  #


Mean Platelet Volume                                       10.9  H


Immature Granulocytes %                                   1.000  H


Neutrophils %                                               70.1


Lymphocytes %                                               16.3


Monocytes %                                                11.3  H


Eosinophils %                                                1.0


Basophils %                                                  0.3


Nucleated Red Blood                                          0.0


Cells %


Immature Granulocytes #                                   0.090  H


Neutrophils #                                                6.2


Lymphocytes #                                                1.5


Monocytes #                                                 1.0  H


Eosinophils #                                                0.1


Basophils #                                                  0.0


Nucleated Red Blood                                          0.0


Cells #


Sodium Level                                                 139


Potassium Level                                              3.8


Chloride Level                                               103


Carbon Dioxide Level                                          24


Anion Gap                                                     12


Blood Urea Nitrogen                                           14


Creatinine                                                  0.47


Est Glomerular Filtrat    
             
             > 60  
       



Rate
mL/min


Glucose Level                                                135


Calcium Level                                                9.2








Medications


Medication





Current Medications


Acetaminophen (Tylenol Tab) 650 mg Q4H  PRN PO MILD PAIN(1-3)OR ELEVATED TEMP 


Last administered on 3/4/19at 17:59; Admin Dose 650 MG;  Start 3/1/19 at 23:00


Enoxaparin Sodium (Lovenox) 40 mg DAILY SC  Last administered on 3/5/19at 09:07;


Admin Dose 40 MG;  Start 3/2/19 at 09:00


Aspirin (Halfprin) 81 mg DAILY PO  Last administered on 3/5/19at 09:01; Admin 


Dose 81 MG;  Start 3/2/19 at 09:00


Atorvastatin Calcium (Lipitor) 10 mg HS PO  Last administered on 3/4/19at 20:50;


Admin Dose 10 MG;  Start 3/2/19 at 21:00


Cholecalciferol (Vitamin D) 2,000 unit DAILY PO  Last administered on 3/5/19at 


09:02; Admin Dose 2,000 UNIT;  Start 3/2/19 at 09:00


Acetaminophen/ Hydrocodone Bitart (Norco (5/325)) 1 tab Q6H  PRN PO MODERATE 


PAIN LEVEL 4-6;  Start 3/1/19 at 23:00


Ibuprofen (Motrin) 800 mg Q8H  PRN PO PAIN;  Start 3/1/19 at 23:00


Letrozole (Femara) 2.5 mg DAILY PO  Last administered on 3/5/19at 09:04; Admin 


Dose 2.5 MG;  Start 3/2/19 at 09:00


Magnesium Oxide (Mag-Ox 400) 400 mg BID PO  Last administered on 3/5/19at 09:01;


Admin Dose 400 MG;  Start 3/2/19 at 09:00


Miscellaneous Information 1 ea NOTE XX ;  Start 3/1/19 at 23:30


Glucose (Glutose) 15 gm Q15M  PRN PO DECREASED GLUCOSE;  Start 3/1/19 at 23:30


Glucose (Glutose) 22.5 gm Q15M  PRN PO DECREASED GLUCOSE;  Start 3/1/19 at 23:30


Dextrose (D50w Syringe) 25 ml Q15M  PRN IV DECREASED GLUCOSE;  Start 3/1/19 at 


23:30


Dextrose (D50w Syringe) 50 ml Q15M  PRN IV DECREASED GLUCOSE;  Start 3/1/19 at 


23:30


Glucagon (Glucagen) 1 mg Q15M  PRN IM DECREASED GLUCOSE;  Start 3/1/19 at 23:30


Glucose (Glutose) 15 gm Q15M  PRN BUCCAL DECREASED GLUCOSE;  Start 3/1/19 at 


23:30


Diagnostic Test (Pha) (Accu-Chek) 1 ea 02 XX  Last administered on 3/3/19at 


01:50; Admin Dose 1 EA;  Start 3/2/19 at 02:00


Insulin Aspart (Novolog Insulin Pen) NOVOLOG *MILD* ALGORITHM WITH MEALS  


BEDTIME SC  Last administered on 3/5/19at 09:05; Admin Dose 1 UNIT;  Start 


3/2/19 at 08:00


Sodium Chloride 1,000 ml @  75 mls/hr [id number] IV  Last administered on 3/5/19at 


06:50; Admin Dose 75 MLS/HR;  Start 3/2/19 at 00:00


Metformin HCl (Glucophage) 500 mg BID WITH  MEALS PO  Last administered on 


3/5/19at 09:01; Admin Dose 500 MG;  Start 3/2/19 at 18:00


Diagnostic Test (Pha) (Accu-Chek) 1 ea AC MEALS AND  BEDTIME XX  Last 


administered on 3/5/19at 09:00; Admin Dose 1 EA;  Start 3/2/19 at 17:30


Linagliptin (Tradjenta) 5 mg DAILY PO  Last administered on 3/5/19at 09:02; 


Admin Dose 5 MG;  Start 3/3/19 at 09:00


Empaglifozin (Jardiance) 10 mg DAILY@08 PO  Last administered on 3/5/19at 09:01;


Admin Dose 10 MG;  Start 3/3/19 at 08:00


Senna (Senokot) 1 tab BID PO  Last administered on 3/5/19at 09:01; Admin Dose 1 


TAB;  Start 3/3/19 at 09:00


Insulin Aspart (Novolog Insulin Pen) 18 unit WITH  MEALS SC  Last administered 


on 3/5/19at 09:06; Admin Dose 18 UNIT;  Start 3/3/19 at 08:00


Insulin Glargine (Lantus) 36 units DAILY SC  Last administered on 3/5/19at 


09:07; Admin Dose 36 UNITS;  Start 3/3/19 at 09:00


Phenol (Cepastat Lozenge) 1 lozenge Q1H  PRN MT SORE THROAT;  Start 3/3/19 at 


14:30


Levofloxacin (Levaquin) 500 mg DAILY@06 PO  Last administered on 3/5/19at 05:00;


Admin Dose 500 MG;  Start 3/3/19 at 16:30


Docusate Sodium (Colace) 100 mg BID PO  Last administered on 3/5/19at 09:01; 


Admin Dose 100 MG;  Start 3/4/19 at 21:00











TYLER CARBAJAL NP             Mar 5, 2019 12:44

## 2019-08-21 ENCOUNTER — HOSPITAL ENCOUNTER (INPATIENT)
Dept: HOSPITAL 91 - E/R | Age: 64
LOS: 6 days | Discharge: HOME | DRG: 683 | End: 2019-08-27
Payer: COMMERCIAL

## 2019-08-21 ENCOUNTER — HOSPITAL ENCOUNTER (INPATIENT)
Dept: HOSPITAL 10 - E/R | Age: 64
LOS: 6 days | Discharge: HOME | DRG: 683 | End: 2019-08-27
Attending: INTERNAL MEDICINE | Admitting: INTERNAL MEDICINE
Payer: COMMERCIAL

## 2019-08-21 VITALS
WEIGHT: 141.54 LBS | WEIGHT: 141.54 LBS | BODY MASS INDEX: 26.05 KG/M2 | HEIGHT: 62 IN | BODY MASS INDEX: 26.05 KG/M2 | HEIGHT: 62 IN

## 2019-08-21 VITALS — HEART RATE: 92 BPM | DIASTOLIC BLOOD PRESSURE: 65 MMHG | SYSTOLIC BLOOD PRESSURE: 143 MMHG | RESPIRATION RATE: 17 BRPM

## 2019-08-21 DIAGNOSIS — E11.65: ICD-10-CM

## 2019-08-21 DIAGNOSIS — E87.5: ICD-10-CM

## 2019-08-21 DIAGNOSIS — E87.1: ICD-10-CM

## 2019-08-21 DIAGNOSIS — Z85.3: ICD-10-CM

## 2019-08-21 DIAGNOSIS — R07.9: ICD-10-CM

## 2019-08-21 DIAGNOSIS — N17.9: Primary | ICD-10-CM

## 2019-08-21 DIAGNOSIS — N39.0: ICD-10-CM

## 2019-08-21 LAB
ADD MAN DIFF?: NO
ALANINE AMINOTRANSFERASE: 62 IU/L (ref 13–69)
ALBUMIN: 4.4 G/DL (ref 3.3–4.9)
ALKALINE PHOSPHATASE: 239 IU/L (ref 42–121)
ANION GAP: 16 (ref 5–13)
ASPARTATE AMINO TRANSFERASE: 53 IU/L (ref 15–46)
BASOPHIL #: 0.1 10^3/UL (ref 0–0.1)
BASOPHILS %: 0.5 % (ref 0–2)
BILIRUBIN,DIRECT: 0 MG/DL (ref 0–0.2)
BILIRUBIN,TOTAL: 0.6 MG/DL (ref 0.2–1.3)
BLOOD UREA NITROGEN: 29 MG/DL (ref 7–20)
CALCIUM: 9.6 MG/DL (ref 8.4–10.2)
CARBON DIOXIDE: 22 MMOL/L (ref 21–31)
CHLORIDE: 89 MMOL/L (ref 97–110)
CK INDEX: 2.1
CK-MB: 0.81 NG/ML (ref 0–2.4)
CREATINE KINASE: 38 IU/L (ref 23–200)
CREATININE: 1.61 MG/DL (ref 0.44–1)
EOSINOPHILS #: 0 10^3/UL (ref 0–0.5)
EOSINOPHILS %: 0.2 % (ref 0–7)
GLUCOSE: 538 MG/DL (ref 70–220)
HEMATOCRIT: 38.5 % (ref 37–47)
HEMOGLOBIN: 13.1 G/DL (ref 12–16)
IMMATURE GRANS #M: 0.06 10^3/UL (ref 0–0.03)
IMMATURE GRANS % (M): 0.4 % (ref 0–0.43)
LIPASE: 171 U/L (ref 23–300)
LYMPHOCYTES #: 3 10^3/UL (ref 0.8–2.9)
LYMPHOCYTES %: 19.3 % (ref 15–51)
MEAN CORPUSCULAR HEMOGLOBIN: 28.7 PG (ref 29–33)
MEAN CORPUSCULAR HGB CONC: 34 G/DL (ref 32–37)
MEAN CORPUSCULAR VOLUME: 84.4 FL (ref 82–101)
MEAN PLATELET VOLUME: 11.7 FL (ref 7.4–10.4)
MONOCYTE #: 1 10^3/UL (ref 0.3–0.9)
MONOCYTES %: 6.6 % (ref 0–11)
NEUTROPHIL #: 11.2 10^3/UL (ref 1.6–7.5)
NEUTROPHILS %: 73 % (ref 39–77)
NUCLEATED RED BLOOD CELLS #: 0 10^3/UL (ref 0–0)
NUCLEATED RED BLOOD CELLS%: 0 /100WBC (ref 0–0)
PLATELET COUNT: 224 10^3/UL (ref 140–415)
POTASSIUM: 6.3 MMOL/L (ref 3.5–5.1)
RED BLOOD COUNT: 4.56 10^6/UL (ref 4.2–5.4)
RED CELL DISTRIBUTION WIDTH: 12.8 % (ref 11.5–14.5)
SODIUM: 127 MMOL/L (ref 135–144)
TOTAL PROTEIN: 7.9 G/DL (ref 6.1–8.1)
TROPONIN-I: 0.04 NG/ML (ref 0–0.12)
TROPONIN-I: 0.07 NG/ML (ref 0–0.12)
URINE KETONES (DIP) POC: (no result)
URINE LEUKOCYTE EST (DIP) POC: (no result)
URINE PH (DIP) POC: 5 (ref 5–8.5)
URINE TOTAL PROTEIN POC: (no result)
WHITE BLOOD COUNT: 15.3 10^3/UL (ref 4.8–10.8)

## 2019-08-21 PROCEDURE — 81001 URINALYSIS AUTO W/SCOPE: CPT

## 2019-08-21 PROCEDURE — 82553 CREATINE MB FRACTION: CPT

## 2019-08-21 PROCEDURE — 71045 X-RAY EXAM CHEST 1 VIEW: CPT

## 2019-08-21 PROCEDURE — 93306 TTE W/DOPPLER COMPLETE: CPT

## 2019-08-21 PROCEDURE — 76705 ECHO EXAM OF ABDOMEN: CPT

## 2019-08-21 PROCEDURE — 80076 HEPATIC FUNCTION PANEL: CPT

## 2019-08-21 PROCEDURE — 36415 COLL VENOUS BLD VENIPUNCTURE: CPT

## 2019-08-21 PROCEDURE — 93017 CV STRESS TEST TRACING ONLY: CPT

## 2019-08-21 PROCEDURE — 93971 EXTREMITY STUDY: CPT

## 2019-08-21 PROCEDURE — A9505 TL201 THALLIUM: HCPCS

## 2019-08-21 PROCEDURE — 87086 URINE CULTURE/COLONY COUNT: CPT

## 2019-08-21 PROCEDURE — 82550 ASSAY OF CK (CPK): CPT

## 2019-08-21 PROCEDURE — 87040 BLOOD CULTURE FOR BACTERIA: CPT

## 2019-08-21 PROCEDURE — A9500 TC99M SESTAMIBI: HCPCS

## 2019-08-21 PROCEDURE — 83690 ASSAY OF LIPASE: CPT

## 2019-08-21 PROCEDURE — 80061 LIPID PANEL: CPT

## 2019-08-21 PROCEDURE — 93005 ELECTROCARDIOGRAM TRACING: CPT

## 2019-08-21 PROCEDURE — 85025 COMPLETE CBC W/AUTO DIFF WBC: CPT

## 2019-08-21 PROCEDURE — 81003 URINALYSIS AUTO W/O SCOPE: CPT

## 2019-08-21 PROCEDURE — 94664 DEMO&/EVAL PT USE INHALER: CPT

## 2019-08-21 PROCEDURE — 96374 THER/PROPH/DIAG INJ IV PUSH: CPT

## 2019-08-21 PROCEDURE — 99285 EMERGENCY DEPT VISIT HI MDM: CPT

## 2019-08-21 PROCEDURE — 83036 HEMOGLOBIN GLYCOSYLATED A1C: CPT

## 2019-08-21 PROCEDURE — 73562 X-RAY EXAM OF KNEE 3: CPT

## 2019-08-21 PROCEDURE — 82962 GLUCOSE BLOOD TEST: CPT

## 2019-08-21 PROCEDURE — 84484 ASSAY OF TROPONIN QUANT: CPT

## 2019-08-21 PROCEDURE — 80048 BASIC METABOLIC PNL TOTAL CA: CPT

## 2019-08-21 PROCEDURE — 78452 HT MUSCLE IMAGE SPECT MULT: CPT

## 2019-08-21 PROCEDURE — 96375 TX/PRO/DX INJ NEW DRUG ADDON: CPT

## 2019-08-21 RX ADMIN — CEFTRIAXONE SCH MLS/HR: 1 INJECTION, SOLUTION INTRAVENOUS at 23:49

## 2019-08-21 RX ADMIN — FOLIC ACID SCH MLS/HR: 5 INJECTION, SOLUTION INTRAMUSCULAR; INTRAVENOUS; SUBCUTANEOUS at 22:30

## 2019-08-21 RX ADMIN — CEFTRIAXONE 1 MLS/HR: 1 INJECTION, SOLUTION INTRAVENOUS at 23:49

## 2019-08-21 RX ADMIN — INSULIN HUMAN 1 UNIT: 100 INJECTION, SOLUTION PARENTERAL at 19:10

## 2019-08-21 RX ADMIN — ALBUTEROL SULFATE 1 MG: 2.5 SOLUTION RESPIRATORY (INHALATION) at 18:53

## 2019-08-21 RX ADMIN — MORPHINE SULFATE 1 MG: 2 INJECTION, SOLUTION INTRAMUSCULAR; INTRAVENOUS at 18:01

## 2019-08-21 RX ADMIN — THIAMINE HYDROCHLORIDE 1 MLS/HR: 100 INJECTION, SOLUTION INTRAMUSCULAR; INTRAVENOUS at 18:01

## 2019-08-21 RX ADMIN — PIPERACILLIN AND TAZOBACTAM 1 MLS/HR: 2; .25 INJECTION, POWDER, FOR SOLUTION INTRAVENOUS at 19:11

## 2019-08-21 RX ADMIN — ACETAMINOPHEN 1 MG: 325 TABLET, FILM COATED ORAL at 19:11

## 2019-08-21 RX ADMIN — ONDANSETRON HYDROCHLORIDE 1 MG: 2 INJECTION, SOLUTION INTRAMUSCULAR; INTRAVENOUS at 18:01

## 2019-08-21 RX ADMIN — SODIUM POLYSTYRENE SULFONATE 1 GM: 15 SUSPENSION ORAL; RECTAL at 23:48

## 2019-08-21 RX ADMIN — THIAMINE HYDROCHLORIDE 1 MLS/HR: 100 INJECTION, SOLUTION INTRAMUSCULAR; INTRAVENOUS at 22:30

## 2019-08-22 VITALS — HEART RATE: 98 BPM | RESPIRATION RATE: 19 BRPM | DIASTOLIC BLOOD PRESSURE: 79 MMHG | SYSTOLIC BLOOD PRESSURE: 125 MMHG

## 2019-08-22 VITALS — SYSTOLIC BLOOD PRESSURE: 128 MMHG | DIASTOLIC BLOOD PRESSURE: 60 MMHG | HEART RATE: 85 BPM | RESPIRATION RATE: 18 BRPM

## 2019-08-22 VITALS — RESPIRATION RATE: 17 BRPM | SYSTOLIC BLOOD PRESSURE: 134 MMHG | HEART RATE: 80 BPM | DIASTOLIC BLOOD PRESSURE: 64 MMHG

## 2019-08-22 VITALS — RESPIRATION RATE: 18 BRPM | SYSTOLIC BLOOD PRESSURE: 142 MMHG | HEART RATE: 76 BPM | DIASTOLIC BLOOD PRESSURE: 67 MMHG

## 2019-08-22 VITALS — RESPIRATION RATE: 18 BRPM | DIASTOLIC BLOOD PRESSURE: 60 MMHG | HEART RATE: 80 BPM | SYSTOLIC BLOOD PRESSURE: 120 MMHG

## 2019-08-22 VITALS — HEART RATE: 88 BPM | RESPIRATION RATE: 18 BRPM | DIASTOLIC BLOOD PRESSURE: 63 MMHG | SYSTOLIC BLOOD PRESSURE: 131 MMHG

## 2019-08-22 LAB
ADD MAN DIFF?: NO
ADD UMIC: YES
ANION GAP: 10 (ref 5–13)
BASOPHIL #: 0.1 10^3/UL (ref 0–0.1)
BASOPHILS %: 0.5 % (ref 0–2)
BLOOD UREA NITROGEN: 18 MG/DL (ref 7–20)
CALCIUM: 9.1 MG/DL (ref 8.4–10.2)
CARBON DIOXIDE: 27 MMOL/L (ref 21–31)
CHLORIDE: 101 MMOL/L (ref 97–110)
CK INDEX: 2.4
CK-MB: 0.78 NG/ML (ref 0–2.4)
CREATINE KINASE: 32 IU/L (ref 23–200)
CREATININE: 0.59 MG/DL (ref 0.44–1)
EOSINOPHILS #: 0.1 10^3/UL (ref 0–0.5)
EOSINOPHILS %: 1.2 % (ref 0–7)
GLUCOSE: 260 MG/DL (ref 70–220)
HEMATOCRIT: 35.3 % (ref 37–47)
HEMOGLOBIN A1C: 10.5 % (ref 0–5.9)
HEMOGLOBIN: 12 G/DL (ref 12–16)
IMMATURE GRANS #M: 0.01 10^3/UL (ref 0–0.03)
IMMATURE GRANS % (M): 0.1 % (ref 0–0.43)
LYMPHOCYTES #: 2.2 10^3/UL (ref 0.8–2.9)
LYMPHOCYTES %: 23.2 % (ref 15–51)
MEAN CORPUSCULAR HEMOGLOBIN: 28.8 PG (ref 29–33)
MEAN CORPUSCULAR HGB CONC: 34 G/DL (ref 32–37)
MEAN CORPUSCULAR VOLUME: 84.9 FL (ref 82–101)
MEAN PLATELET VOLUME: 11.1 FL (ref 7.4–10.4)
MONOCYTE #: 0.8 10^3/UL (ref 0.3–0.9)
MONOCYTES %: 8.7 % (ref 0–11)
NEUTROPHIL #: 6.2 10^3/UL (ref 1.6–7.5)
NEUTROPHILS %: 66.3 % (ref 39–77)
NUCLEATED RED BLOOD CELLS #: 0 10^3/UL (ref 0–0)
NUCLEATED RED BLOOD CELLS%: 0 /100WBC (ref 0–0)
PLATELET COUNT: 182 10^3/UL (ref 140–415)
POTASSIUM: 4.4 MMOL/L (ref 3.5–5.1)
RED BLOOD COUNT: 4.16 10^6/UL (ref 4.2–5.4)
RED CELL DISTRIBUTION WIDTH: 12.9 % (ref 11.5–14.5)
SODIUM: 138 MMOL/L (ref 135–144)
TROPONIN-I: 0.07 NG/ML (ref 0–0.12)
TROPONIN-I: 0.1 NG/ML (ref 0–0.12)
TROPONIN-I: 0.14 NG/ML (ref 0–0.12)
UR ASCORBIC ACID: NEGATIVE MG/DL
UR BILIRUBIN (DIP): NEGATIVE MG/DL
UR BLOOD (DIP): (no result) MG/DL
UR CLARITY: CLEAR
UR COLOR: (no result)
UR GLUCOSE (DIP): (no result) MG/DL
UR KETONES (DIP): NEGATIVE MG/DL
UR LEUKOCYTE ESTERASE (DIP): NEGATIVE LEU/UL
UR NITRITE (DIP): NEGATIVE MG/DL
UR PH (DIP): 6 (ref 5–9)
UR RBC: 0 /HPF (ref 0–5)
UR SPECIFIC GRAVITY (DIP): 1.01 (ref 1–1.03)
UR TOTAL PROTEIN (DIP): NEGATIVE MG/DL
UR UROBILINOGEN (DIP): NEGATIVE MG/DL
UR WBC: 1 /HPF (ref 0–5)
WHITE BLOOD COUNT: 9.4 10^3/UL (ref 4.8–10.8)

## 2019-08-22 RX ADMIN — MORPHINE SULFATE PRN MG: 2 INJECTION, SOLUTION INTRAMUSCULAR; INTRAVENOUS at 08:54

## 2019-08-22 RX ADMIN — INSULIN ASPART 1 UNIT: 100 INJECTION, SOLUTION INTRAVENOUS; SUBCUTANEOUS at 11:46

## 2019-08-22 RX ADMIN — MORPHINE SULFATE 1 MG: 2 INJECTION, SOLUTION INTRAMUSCULAR; INTRAVENOUS at 08:54

## 2019-08-22 RX ADMIN — THIAMINE HYDROCHLORIDE 1 MLS/HR: 100 INJECTION, SOLUTION INTRAMUSCULAR; INTRAVENOUS at 17:39

## 2019-08-22 RX ADMIN — INSULIN GLARGINE 1 UNITS: 100 INJECTION, SOLUTION SUBCUTANEOUS at 20:37

## 2019-08-22 RX ADMIN — INSULIN ASPART 1 UNIT: 100 INJECTION, SOLUTION INTRAVENOUS; SUBCUTANEOUS at 03:17

## 2019-08-22 RX ADMIN — ENOXAPARIN SODIUM SCH MG: 100 INJECTION SUBCUTANEOUS at 08:53

## 2019-08-22 RX ADMIN — INSULIN HUMAN 1 UNIT: 100 INJECTION, SOLUTION PARENTERAL at 00:13

## 2019-08-22 RX ADMIN — FOLIC ACID SCH MLS/HR: 5 INJECTION, SOLUTION INTRAMUSCULAR; INTRAVENOUS; SUBCUTANEOUS at 08:48

## 2019-08-22 RX ADMIN — CEFTRIAXONE 1 MLS/HR: 1 INJECTION, SOLUTION INTRAVENOUS at 23:34

## 2019-08-22 RX ADMIN — INSULIN GLARGINE 1 UNITS: 100 INJECTION, SOLUTION SUBCUTANEOUS at 00:12

## 2019-08-22 RX ADMIN — INSULIN ASPART 1 UNIT: 100 INJECTION, SOLUTION INTRAVENOUS; SUBCUTANEOUS at 11:47

## 2019-08-22 RX ADMIN — INSULIN GLARGINE SCH UNITS: 100 INJECTION, SOLUTION SUBCUTANEOUS at 00:12

## 2019-08-22 RX ADMIN — INSULIN HUMAN 1 UNIT: 100 INJECTION, SUSPENSION SUBCUTANEOUS at 00:12

## 2019-08-22 RX ADMIN — THIAMINE HYDROCHLORIDE 1 MLS/HR: 100 INJECTION, SOLUTION INTRAMUSCULAR; INTRAVENOUS at 08:48

## 2019-08-22 RX ADMIN — ASPIRIN SCH MG: 81 TABLET, COATED ORAL at 08:48

## 2019-08-22 RX ADMIN — INSULIN ASPART 1 UNIT: 100 INJECTION, SOLUTION INTRAVENOUS; SUBCUTANEOUS at 20:38

## 2019-08-22 RX ADMIN — INSULIN GLARGINE SCH UNITS: 100 INJECTION, SOLUTION SUBCUTANEOUS at 20:37

## 2019-08-22 RX ADMIN — FOLIC ACID SCH MLS/HR: 5 INJECTION, SOLUTION INTRAMUSCULAR; INTRAVENOUS; SUBCUTANEOUS at 17:39

## 2019-08-22 RX ADMIN — ASPIRIN 1 MG: 81 TABLET, COATED ORAL at 08:48

## 2019-08-22 RX ADMIN — INSULIN ASPART 1 UNIT: 100 INJECTION, SOLUTION INTRAVENOUS; SUBCUTANEOUS at 17:15

## 2019-08-22 RX ADMIN — ENOXAPARIN SODIUM 1 MG: 100 INJECTION SUBCUTANEOUS at 08:53

## 2019-08-22 RX ADMIN — CEFTRIAXONE SCH MLS/HR: 1 INJECTION, SOLUTION INTRAVENOUS at 23:34

## 2019-08-22 RX ADMIN — INSULIN ASPART 1 UNIT: 100 INJECTION, SOLUTION INTRAVENOUS; SUBCUTANEOUS at 07:37

## 2019-08-23 VITALS — HEART RATE: 72 BPM | RESPIRATION RATE: 18 BRPM | SYSTOLIC BLOOD PRESSURE: 163 MMHG | DIASTOLIC BLOOD PRESSURE: 72 MMHG

## 2019-08-23 VITALS — SYSTOLIC BLOOD PRESSURE: 130 MMHG | HEART RATE: 77 BPM | DIASTOLIC BLOOD PRESSURE: 63 MMHG | RESPIRATION RATE: 18 BRPM

## 2019-08-23 VITALS — SYSTOLIC BLOOD PRESSURE: 151 MMHG | HEART RATE: 76 BPM | RESPIRATION RATE: 18 BRPM | DIASTOLIC BLOOD PRESSURE: 69 MMHG

## 2019-08-23 VITALS — SYSTOLIC BLOOD PRESSURE: 145 MMHG | HEART RATE: 65 BPM | DIASTOLIC BLOOD PRESSURE: 64 MMHG | RESPIRATION RATE: 19 BRPM

## 2019-08-23 VITALS — RESPIRATION RATE: 19 BRPM | DIASTOLIC BLOOD PRESSURE: 72 MMHG | SYSTOLIC BLOOD PRESSURE: 169 MMHG

## 2019-08-23 VITALS — SYSTOLIC BLOOD PRESSURE: 152 MMHG | RESPIRATION RATE: 18 BRPM | DIASTOLIC BLOOD PRESSURE: 74 MMHG | HEART RATE: 77 BPM

## 2019-08-23 VITALS — HEART RATE: 73 BPM | RESPIRATION RATE: 19 BRPM | DIASTOLIC BLOOD PRESSURE: 66 MMHG | SYSTOLIC BLOOD PRESSURE: 152 MMHG

## 2019-08-23 LAB
CHOL/HDL RATIO: 4.1 RATIO
CHOLESTEROL: 112 MG/DL (ref 100–200)
HDL CHOLESTEROL: 27 MG/DL (ref 35–98)
LDL CHOLESTEROL,CALCULATED: 51 MG/DL
TRIGLYCERIDES: 168 MG/DL (ref 0–149)

## 2019-08-23 RX ADMIN — INSULIN GLARGINE 1 UNITS: 100 INJECTION, SOLUTION SUBCUTANEOUS at 21:07

## 2019-08-23 RX ADMIN — INSULIN ASPART 1 UNIT: 100 INJECTION, SOLUTION INTRAVENOUS; SUBCUTANEOUS at 12:00

## 2019-08-23 RX ADMIN — FOLIC ACID SCH MLS/HR: 5 INJECTION, SOLUTION INTRAMUSCULAR; INTRAVENOUS; SUBCUTANEOUS at 05:03

## 2019-08-23 RX ADMIN — THIAMINE HYDROCHLORIDE 1 MLS/HR: 100 INJECTION, SOLUTION INTRAMUSCULAR; INTRAVENOUS at 05:03

## 2019-08-23 RX ADMIN — ENOXAPARIN SODIUM SCH MG: 100 INJECTION SUBCUTANEOUS at 09:28

## 2019-08-23 RX ADMIN — INSULIN ASPART 1 UNIT: 100 INJECTION, SOLUTION INTRAVENOUS; SUBCUTANEOUS at 17:44

## 2019-08-23 RX ADMIN — INSULIN ASPART 1 UNIT: 100 INJECTION, SOLUTION INTRAVENOUS; SUBCUTANEOUS at 10:10

## 2019-08-23 RX ADMIN — INSULIN ASPART 1 UNIT: 100 INJECTION, SOLUTION INTRAVENOUS; SUBCUTANEOUS at 21:06

## 2019-08-23 RX ADMIN — ASPIRIN 1 MG: 81 TABLET, COATED ORAL at 14:48

## 2019-08-23 RX ADMIN — REGADENOSON 1 MG: 0.08 INJECTION, SOLUTION INTRAVENOUS at 12:20

## 2019-08-23 RX ADMIN — ASPIRIN SCH MG: 81 TABLET, COATED ORAL at 14:48

## 2019-08-23 RX ADMIN — AMLODIPINE BESYLATE 1 MG: 5 TABLET ORAL at 23:05

## 2019-08-23 RX ADMIN — FOLIC ACID SCH MLS/HR: 5 INJECTION, SOLUTION INTRAMUSCULAR; INTRAVENOUS; SUBCUTANEOUS at 14:30

## 2019-08-23 RX ADMIN — AMLODIPINE BESYLATE SCH MG: 5 TABLET ORAL at 23:05

## 2019-08-23 RX ADMIN — POTASSIUM ACETATE 1 MLS/HR: 3.93 INJECTION, SOLUTION, CONCENTRATE INTRAVENOUS at 23:05

## 2019-08-23 RX ADMIN — INSULIN ASPART 1 UNIT: 100 INJECTION, SOLUTION INTRAVENOUS; SUBCUTANEOUS at 17:43

## 2019-08-23 RX ADMIN — ENOXAPARIN SODIUM 1 MG: 100 INJECTION SUBCUTANEOUS at 09:28

## 2019-08-23 RX ADMIN — INSULIN ASPART 1 UNIT: 100 INJECTION, SOLUTION INTRAVENOUS; SUBCUTANEOUS at 07:56

## 2019-08-23 RX ADMIN — CALCIUM GLUCONATE SCH MLS/HR: 94 INJECTION, SOLUTION INTRAVENOUS at 23:05

## 2019-08-23 RX ADMIN — THIAMINE HYDROCHLORIDE 1 MLS/HR: 100 INJECTION, SOLUTION INTRAMUSCULAR; INTRAVENOUS at 14:30

## 2019-08-23 RX ADMIN — ACETAMINOPHEN 1 MG: 325 TABLET, FILM COATED ORAL at 16:53

## 2019-08-23 RX ADMIN — INSULIN GLARGINE SCH UNITS: 100 INJECTION, SOLUTION SUBCUTANEOUS at 21:07

## 2019-08-24 VITALS — HEART RATE: 67 BPM | DIASTOLIC BLOOD PRESSURE: 65 MMHG | RESPIRATION RATE: 18 BRPM | SYSTOLIC BLOOD PRESSURE: 131 MMHG

## 2019-08-24 VITALS — HEART RATE: 72 BPM | DIASTOLIC BLOOD PRESSURE: 79 MMHG | SYSTOLIC BLOOD PRESSURE: 125 MMHG | RESPIRATION RATE: 18 BRPM

## 2019-08-24 VITALS — DIASTOLIC BLOOD PRESSURE: 66 MMHG | SYSTOLIC BLOOD PRESSURE: 147 MMHG | RESPIRATION RATE: 18 BRPM | HEART RATE: 72 BPM

## 2019-08-24 VITALS — HEART RATE: 72 BPM | SYSTOLIC BLOOD PRESSURE: 135 MMHG | DIASTOLIC BLOOD PRESSURE: 64 MMHG | RESPIRATION RATE: 18 BRPM

## 2019-08-24 VITALS — DIASTOLIC BLOOD PRESSURE: 64 MMHG | HEART RATE: 60 BPM | SYSTOLIC BLOOD PRESSURE: 155 MMHG | RESPIRATION RATE: 18 BRPM

## 2019-08-24 RX ADMIN — INSULIN GLARGINE 1 UNITS: 100 INJECTION, SOLUTION SUBCUTANEOUS at 20:33

## 2019-08-24 RX ADMIN — ASPIRIN SCH MG: 81 TABLET, COATED ORAL at 09:10

## 2019-08-24 RX ADMIN — INSULIN ASPART 1 UNIT: 100 INJECTION, SOLUTION INTRAVENOUS; SUBCUTANEOUS at 08:00

## 2019-08-24 RX ADMIN — INSULIN ASPART 1 UNIT: 100 INJECTION, SOLUTION INTRAVENOUS; SUBCUTANEOUS at 12:10

## 2019-08-24 RX ADMIN — INSULIN ASPART 1 UNIT: 100 INJECTION, SOLUTION INTRAVENOUS; SUBCUTANEOUS at 17:18

## 2019-08-24 RX ADMIN — ACETAMINOPHEN 1 MG: 325 TABLET, FILM COATED ORAL at 17:22

## 2019-08-24 RX ADMIN — INSULIN ASPART 1 UNIT: 100 INJECTION, SOLUTION INTRAVENOUS; SUBCUTANEOUS at 17:41

## 2019-08-24 RX ADMIN — AMLODIPINE BESYLATE SCH MG: 5 TABLET ORAL at 09:11

## 2019-08-24 RX ADMIN — CALCIUM GLUCONATE SCH MLS/HR: 94 INJECTION, SOLUTION INTRAVENOUS at 17:22

## 2019-08-24 RX ADMIN — AMLODIPINE BESYLATE SCH MG: 5 TABLET ORAL at 20:22

## 2019-08-24 RX ADMIN — INSULIN GLARGINE SCH UNITS: 100 INJECTION, SOLUTION SUBCUTANEOUS at 20:33

## 2019-08-24 RX ADMIN — POTASSIUM ACETATE 1 MLS/HR: 3.93 INJECTION, SOLUTION, CONCENTRATE INTRAVENOUS at 17:22

## 2019-08-24 RX ADMIN — ENOXAPARIN SODIUM 1 MG: 100 INJECTION SUBCUTANEOUS at 09:15

## 2019-08-24 RX ADMIN — AMLODIPINE BESYLATE 1 MG: 5 TABLET ORAL at 09:11

## 2019-08-24 RX ADMIN — ASPIRIN 1 MG: 81 TABLET, COATED ORAL at 09:10

## 2019-08-24 RX ADMIN — INSULIN ASPART 1 UNIT: 100 INJECTION, SOLUTION INTRAVENOUS; SUBCUTANEOUS at 21:00

## 2019-08-24 RX ADMIN — INSULIN ASPART 1 UNIT: 100 INJECTION, SOLUTION INTRAVENOUS; SUBCUTANEOUS at 08:29

## 2019-08-24 RX ADMIN — AMLODIPINE BESYLATE 1 MG: 5 TABLET ORAL at 20:22

## 2019-08-24 RX ADMIN — ENOXAPARIN SODIUM SCH MG: 100 INJECTION SUBCUTANEOUS at 09:15

## 2019-08-25 VITALS — DIASTOLIC BLOOD PRESSURE: 65 MMHG | SYSTOLIC BLOOD PRESSURE: 146 MMHG | HEART RATE: 73 BPM | RESPIRATION RATE: 18 BRPM

## 2019-08-25 VITALS — SYSTOLIC BLOOD PRESSURE: 110 MMHG | RESPIRATION RATE: 16 BRPM | DIASTOLIC BLOOD PRESSURE: 74 MMHG | HEART RATE: 66 BPM

## 2019-08-25 VITALS — SYSTOLIC BLOOD PRESSURE: 117 MMHG | RESPIRATION RATE: 18 BRPM | DIASTOLIC BLOOD PRESSURE: 59 MMHG | HEART RATE: 67 BPM

## 2019-08-25 VITALS — SYSTOLIC BLOOD PRESSURE: 120 MMHG | RESPIRATION RATE: 18 BRPM | DIASTOLIC BLOOD PRESSURE: 79 MMHG | HEART RATE: 79 BPM

## 2019-08-25 VITALS — RESPIRATION RATE: 18 BRPM | DIASTOLIC BLOOD PRESSURE: 70 MMHG | SYSTOLIC BLOOD PRESSURE: 156 MMHG | HEART RATE: 67 BPM

## 2019-08-25 VITALS — SYSTOLIC BLOOD PRESSURE: 132 MMHG | DIASTOLIC BLOOD PRESSURE: 74 MMHG | HEART RATE: 65 BPM | RESPIRATION RATE: 18 BRPM

## 2019-08-25 RX ADMIN — INSULIN ASPART 1 UNIT: 100 INJECTION, SOLUTION INTRAVENOUS; SUBCUTANEOUS at 07:39

## 2019-08-25 RX ADMIN — INSULIN ASPART 1 UNIT: 100 INJECTION, SOLUTION INTRAVENOUS; SUBCUTANEOUS at 17:19

## 2019-08-25 RX ADMIN — CALCIUM GLUCONATE SCH MLS/HR: 94 INJECTION, SOLUTION INTRAVENOUS at 17:14

## 2019-08-25 RX ADMIN — AMLODIPINE BESYLATE SCH MG: 5 TABLET ORAL at 20:31

## 2019-08-25 RX ADMIN — INSULIN ASPART 1 UNIT: 100 INJECTION, SOLUTION INTRAVENOUS; SUBCUTANEOUS at 17:18

## 2019-08-25 RX ADMIN — AMLODIPINE BESYLATE 1 MG: 5 TABLET ORAL at 08:31

## 2019-08-25 RX ADMIN — INSULIN ASPART 1 UNIT: 100 INJECTION, SOLUTION INTRAVENOUS; SUBCUTANEOUS at 20:31

## 2019-08-25 RX ADMIN — AMLODIPINE BESYLATE 1 MG: 5 TABLET ORAL at 20:31

## 2019-08-25 RX ADMIN — INSULIN GLARGINE 1 UNITS: 100 INJECTION, SOLUTION SUBCUTANEOUS at 20:00

## 2019-08-25 RX ADMIN — AMLODIPINE BESYLATE SCH MG: 5 TABLET ORAL at 08:31

## 2019-08-25 RX ADMIN — ASPIRIN 1 MG: 81 TABLET, COATED ORAL at 08:31

## 2019-08-25 RX ADMIN — ASPIRIN SCH MG: 81 TABLET, COATED ORAL at 08:31

## 2019-08-25 RX ADMIN — INSULIN GLARGINE SCH UNITS: 100 INJECTION, SOLUTION SUBCUTANEOUS at 20:00

## 2019-08-25 RX ADMIN — ACETAMINOPHEN 1 MG: 325 TABLET, FILM COATED ORAL at 22:33

## 2019-08-25 RX ADMIN — INSULIN ASPART 1 UNIT: 100 INJECTION, SOLUTION INTRAVENOUS; SUBCUTANEOUS at 12:01

## 2019-08-25 RX ADMIN — POTASSIUM ACETATE 1 MLS/HR: 3.93 INJECTION, SOLUTION, CONCENTRATE INTRAVENOUS at 17:14

## 2019-08-25 RX ADMIN — ENOXAPARIN SODIUM 1 MG: 100 INJECTION SUBCUTANEOUS at 08:35

## 2019-08-25 RX ADMIN — INSULIN GLARGINE 1 UNITS: 100 INJECTION, SOLUTION SUBCUTANEOUS at 22:30

## 2019-08-25 RX ADMIN — ENOXAPARIN SODIUM SCH MG: 100 INJECTION SUBCUTANEOUS at 08:35

## 2019-08-26 VITALS — RESPIRATION RATE: 18 BRPM | DIASTOLIC BLOOD PRESSURE: 67 MMHG | HEART RATE: 68 BPM | SYSTOLIC BLOOD PRESSURE: 157 MMHG

## 2019-08-26 VITALS — DIASTOLIC BLOOD PRESSURE: 68 MMHG | RESPIRATION RATE: 18 BRPM | HEART RATE: 71 BPM | SYSTOLIC BLOOD PRESSURE: 141 MMHG

## 2019-08-26 VITALS — RESPIRATION RATE: 18 BRPM | SYSTOLIC BLOOD PRESSURE: 154 MMHG | HEART RATE: 70 BPM | DIASTOLIC BLOOD PRESSURE: 70 MMHG

## 2019-08-26 VITALS — HEART RATE: 72 BPM | RESPIRATION RATE: 18 BRPM | SYSTOLIC BLOOD PRESSURE: 136 MMHG | DIASTOLIC BLOOD PRESSURE: 65 MMHG

## 2019-08-26 VITALS — RESPIRATION RATE: 18 BRPM | SYSTOLIC BLOOD PRESSURE: 125 MMHG | DIASTOLIC BLOOD PRESSURE: 88 MMHG | HEART RATE: 79 BPM

## 2019-08-26 VITALS — SYSTOLIC BLOOD PRESSURE: 148 MMHG | DIASTOLIC BLOOD PRESSURE: 70 MMHG | HEART RATE: 69 BPM | RESPIRATION RATE: 18 BRPM

## 2019-08-26 VITALS — DIASTOLIC BLOOD PRESSURE: 64 MMHG | RESPIRATION RATE: 18 BRPM | SYSTOLIC BLOOD PRESSURE: 136 MMHG | HEART RATE: 65 BPM

## 2019-08-26 RX ADMIN — INSULIN ASPART 1 UNIT: 100 INJECTION, SOLUTION INTRAVENOUS; SUBCUTANEOUS at 12:05

## 2019-08-26 RX ADMIN — POTASSIUM ACETATE 1 MLS/HR: 3.93 INJECTION, SOLUTION, CONCENTRATE INTRAVENOUS at 16:21

## 2019-08-26 RX ADMIN — ASPIRIN SCH MG: 81 TABLET, COATED ORAL at 09:02

## 2019-08-26 RX ADMIN — ENOXAPARIN SODIUM SCH MG: 100 INJECTION SUBCUTANEOUS at 09:09

## 2019-08-26 RX ADMIN — AMLODIPINE BESYLATE SCH MG: 5 TABLET ORAL at 09:02

## 2019-08-26 RX ADMIN — INSULIN ASPART 1 UNIT: 100 INJECTION, SOLUTION INTRAVENOUS; SUBCUTANEOUS at 08:12

## 2019-08-26 RX ADMIN — INSULIN ASPART 1 UNIT: 100 INJECTION, SOLUTION INTRAVENOUS; SUBCUTANEOUS at 08:10

## 2019-08-26 RX ADMIN — INSULIN ASPART 1 UNIT: 100 INJECTION, SOLUTION INTRAVENOUS; SUBCUTANEOUS at 12:06

## 2019-08-26 RX ADMIN — AMLODIPINE BESYLATE 1 MG: 5 TABLET ORAL at 21:03

## 2019-08-26 RX ADMIN — ENOXAPARIN SODIUM 1 MG: 100 INJECTION SUBCUTANEOUS at 09:09

## 2019-08-26 RX ADMIN — INSULIN GLARGINE SCH UNITS: 100 INJECTION, SOLUTION SUBCUTANEOUS at 20:00

## 2019-08-26 RX ADMIN — ASPIRIN 1 MG: 81 TABLET, COATED ORAL at 09:02

## 2019-08-26 RX ADMIN — CALCIUM GLUCONATE SCH MLS/HR: 94 INJECTION, SOLUTION INTRAVENOUS at 16:21

## 2019-08-26 RX ADMIN — ACETAMINOPHEN 1 MG: 325 TABLET, FILM COATED ORAL at 16:29

## 2019-08-26 RX ADMIN — INSULIN GLARGINE 1 UNITS: 100 INJECTION, SOLUTION SUBCUTANEOUS at 20:00

## 2019-08-26 RX ADMIN — ACETAMINOPHEN 1 MG: 325 TABLET, FILM COATED ORAL at 21:16

## 2019-08-26 RX ADMIN — INSULIN ASPART 1 UNIT: 100 INJECTION, SOLUTION INTRAVENOUS; SUBCUTANEOUS at 16:45

## 2019-08-26 RX ADMIN — INSULIN ASPART 1 UNIT: 100 INJECTION, SOLUTION INTRAVENOUS; SUBCUTANEOUS at 21:22

## 2019-08-26 RX ADMIN — AMLODIPINE BESYLATE SCH MG: 5 TABLET ORAL at 21:03

## 2019-08-26 RX ADMIN — AMLODIPINE BESYLATE 1 MG: 5 TABLET ORAL at 09:02

## 2019-08-27 VITALS — RESPIRATION RATE: 20 BRPM | SYSTOLIC BLOOD PRESSURE: 128 MMHG | DIASTOLIC BLOOD PRESSURE: 60 MMHG | HEART RATE: 63 BPM

## 2019-08-27 VITALS — RESPIRATION RATE: 18 BRPM | DIASTOLIC BLOOD PRESSURE: 64 MMHG | SYSTOLIC BLOOD PRESSURE: 141 MMHG | HEART RATE: 67 BPM

## 2019-08-27 VITALS — RESPIRATION RATE: 20 BRPM | DIASTOLIC BLOOD PRESSURE: 64 MMHG | SYSTOLIC BLOOD PRESSURE: 136 MMHG | HEART RATE: 65 BPM

## 2019-08-27 VITALS — DIASTOLIC BLOOD PRESSURE: 56 MMHG | HEART RATE: 63 BPM | SYSTOLIC BLOOD PRESSURE: 118 MMHG | RESPIRATION RATE: 20 BRPM

## 2019-08-27 LAB
ADD MAN DIFF?: NO
ANION GAP: 8 (ref 5–13)
BASOPHIL #: 0.1 10^3/UL (ref 0–0.1)
BASOPHILS %: 0.9 % (ref 0–2)
BLOOD UREA NITROGEN: 9 MG/DL (ref 7–20)
CALCIUM: 9.2 MG/DL (ref 8.4–10.2)
CARBON DIOXIDE: 28 MMOL/L (ref 21–31)
CHLORIDE: 103 MMOL/L (ref 97–110)
CREATININE: 0.47 MG/DL (ref 0.44–1)
EOSINOPHILS #: 0.3 10^3/UL (ref 0–0.5)
EOSINOPHILS %: 3.6 % (ref 0–7)
GLUCOSE: 151 MG/DL (ref 70–220)
HEMATOCRIT: 35.7 % (ref 37–47)
HEMOGLOBIN: 11.8 G/DL (ref 12–16)
IMMATURE GRANS #M: 0.03 10^3/UL (ref 0–0.03)
IMMATURE GRANS % (M): 0.4 % (ref 0–0.43)
LYMPHOCYTES #: 2.6 10^3/UL (ref 0.8–2.9)
LYMPHOCYTES %: 31.5 % (ref 15–51)
MEAN CORPUSCULAR HEMOGLOBIN: 28.6 PG (ref 29–33)
MEAN CORPUSCULAR HGB CONC: 33.1 G/DL (ref 32–37)
MEAN CORPUSCULAR VOLUME: 86.7 FL (ref 82–101)
MEAN PLATELET VOLUME: 10.4 FL (ref 7.4–10.4)
MONOCYTE #: 0.6 10^3/UL (ref 0.3–0.9)
MONOCYTES %: 7.4 % (ref 0–11)
NEUTROPHIL #: 4.6 10^3/UL (ref 1.6–7.5)
NEUTROPHILS %: 56.2 % (ref 39–77)
NUCLEATED RED BLOOD CELLS #: 0 10^3/UL (ref 0–0)
NUCLEATED RED BLOOD CELLS%: 0 /100WBC (ref 0–0)
PLATELET COUNT: 242 10^3/UL (ref 140–415)
POTASSIUM: 4.2 MMOL/L (ref 3.5–5.1)
RED BLOOD COUNT: 4.12 10^6/UL (ref 4.2–5.4)
RED CELL DISTRIBUTION WIDTH: 12.9 % (ref 11.5–14.5)
SODIUM: 139 MMOL/L (ref 135–144)
WHITE BLOOD COUNT: 8.1 10^3/UL (ref 4.8–10.8)

## 2019-08-27 RX ADMIN — INSULIN ASPART 1 UNIT: 100 INJECTION, SOLUTION INTRAVENOUS; SUBCUTANEOUS at 17:02

## 2019-08-27 RX ADMIN — ENOXAPARIN SODIUM SCH MG: 100 INJECTION SUBCUTANEOUS at 08:58

## 2019-08-27 RX ADMIN — ASPIRIN SCH MG: 81 TABLET, COATED ORAL at 08:54

## 2019-08-27 RX ADMIN — INSULIN ASPART 1 UNIT: 100 INJECTION, SOLUTION INTRAVENOUS; SUBCUTANEOUS at 17:03

## 2019-08-27 RX ADMIN — BISACODYL 1 MG: 5 TABLET, COATED ORAL at 12:15

## 2019-08-27 RX ADMIN — INSULIN ASPART 1 UNIT: 100 INJECTION, SOLUTION INTRAVENOUS; SUBCUTANEOUS at 11:18

## 2019-08-27 RX ADMIN — MORPHINE SULFATE 1 MG: 2 INJECTION, SOLUTION INTRAMUSCULAR; INTRAVENOUS at 11:03

## 2019-08-27 RX ADMIN — DOCUSATE SODIUM 1 MG: 100 CAPSULE, LIQUID FILLED ORAL at 12:15

## 2019-08-27 RX ADMIN — ENOXAPARIN SODIUM 1 MG: 100 INJECTION SUBCUTANEOUS at 08:58

## 2019-08-27 RX ADMIN — ASPIRIN 1 MG: 81 TABLET, COATED ORAL at 08:54

## 2019-08-27 RX ADMIN — AMLODIPINE BESYLATE SCH MG: 5 TABLET ORAL at 08:54

## 2019-08-27 RX ADMIN — AMLODIPINE BESYLATE 1 MG: 5 TABLET ORAL at 08:54

## 2019-08-27 RX ADMIN — INSULIN ASPART 1 UNIT: 100 INJECTION, SOLUTION INTRAVENOUS; SUBCUTANEOUS at 07:57

## 2019-08-27 RX ADMIN — MORPHINE SULFATE PRN MG: 2 INJECTION, SOLUTION INTRAMUSCULAR; INTRAVENOUS at 11:03

## 2019-08-27 RX ADMIN — MAGNESIUM HYDROXIDE 1 ML: 400 SUSPENSION ORAL at 12:15

## 2019-08-27 RX ADMIN — INSULIN ASPART 1 UNIT: 100 INJECTION, SOLUTION INTRAVENOUS; SUBCUTANEOUS at 07:58
